# Patient Record
Sex: FEMALE | Race: WHITE | NOT HISPANIC OR LATINO | URBAN - METROPOLITAN AREA
[De-identification: names, ages, dates, MRNs, and addresses within clinical notes are randomized per-mention and may not be internally consistent; named-entity substitution may affect disease eponyms.]

---

## 2019-07-29 PROBLEM — Z00.00 ENCOUNTER FOR PREVENTIVE HEALTH EXAMINATION: Status: ACTIVE | Noted: 2019-07-29

## 2019-08-01 ENCOUNTER — OUTPATIENT (OUTPATIENT)
Dept: OUTPATIENT SERVICES | Facility: HOSPITAL | Age: 84
LOS: 1 days | End: 2019-08-01
Payer: MEDICARE

## 2019-08-01 DIAGNOSIS — R00.2 PALPITATIONS: ICD-10-CM

## 2019-08-01 PROCEDURE — 93227 XTRNL ECG REC<48 HR R&I: CPT

## 2019-08-02 ENCOUNTER — OUTPATIENT (OUTPATIENT)
Dept: OUTPATIENT SERVICES | Facility: HOSPITAL | Age: 84
LOS: 1 days | End: 2019-08-02
Payer: SELF-PAY

## 2019-08-02 ENCOUNTER — APPOINTMENT (OUTPATIENT)
Dept: CT IMAGING | Facility: HOSPITAL | Age: 84
End: 2019-08-02

## 2019-08-02 PROCEDURE — 75571 CT HRT W/O DYE W/CA TEST: CPT | Mod: 26

## 2019-08-03 ENCOUNTER — INPATIENT (INPATIENT)
Facility: HOSPITAL | Age: 84
LOS: 0 days | Discharge: HOME | DRG: 292 | End: 2019-08-04
Attending: INTERNAL MEDICINE | Admitting: INTERNAL MEDICINE
Payer: MEDICARE

## 2019-08-03 VITALS
OXYGEN SATURATION: 99 % | SYSTOLIC BLOOD PRESSURE: 218 MMHG | DIASTOLIC BLOOD PRESSURE: 110 MMHG | TEMPERATURE: 98 F | RESPIRATION RATE: 18 BRPM | WEIGHT: 149.91 LBS | HEART RATE: 99 BPM | HEIGHT: 66 IN

## 2019-08-03 DIAGNOSIS — I10 ESSENTIAL (PRIMARY) HYPERTENSION: ICD-10-CM

## 2019-08-03 DIAGNOSIS — R09.89 OTHER SPECIFIED SYMPTOMS AND SIGNS INVOLVING THE CIRCULATORY AND RESPIRATORY SYSTEMS: ICD-10-CM

## 2019-08-03 LAB
ALBUMIN SERPL ELPH-MCNC: 3.8 G/DL — SIGNIFICANT CHANGE UP (ref 3.3–5)
ALP SERPL-CCNC: 81 U/L — SIGNIFICANT CHANGE UP (ref 40–120)
ALT FLD-CCNC: 100 U/L — HIGH (ref 10–45)
ANION GAP SERPL CALC-SCNC: 11 MMOL/L — SIGNIFICANT CHANGE UP (ref 5–17)
ANION GAP SERPL CALC-SCNC: 12 MMOL/L — SIGNIFICANT CHANGE UP (ref 5–17)
APPEARANCE UR: CLEAR — SIGNIFICANT CHANGE UP
APTT BLD: 26.9 SEC — LOW (ref 27.5–36.3)
AST SERPL-CCNC: 61 U/L — HIGH (ref 10–40)
BASOPHILS # BLD AUTO: 0.03 K/UL — SIGNIFICANT CHANGE UP (ref 0–0.2)
BASOPHILS NFR BLD AUTO: 0.5 % — SIGNIFICANT CHANGE UP (ref 0–2)
BILIRUB SERPL-MCNC: 0.4 MG/DL — SIGNIFICANT CHANGE UP (ref 0.2–1.2)
BILIRUB UR-MCNC: NEGATIVE — SIGNIFICANT CHANGE UP
BUN SERPL-MCNC: 13 MG/DL — SIGNIFICANT CHANGE UP (ref 7–23)
BUN SERPL-MCNC: 13 MG/DL — SIGNIFICANT CHANGE UP (ref 7–23)
CALCIUM SERPL-MCNC: 9.3 MG/DL — SIGNIFICANT CHANGE UP (ref 8.4–10.5)
CALCIUM SERPL-MCNC: 9.6 MG/DL — SIGNIFICANT CHANGE UP (ref 8.4–10.5)
CHLORIDE SERPL-SCNC: 107 MMOL/L — SIGNIFICANT CHANGE UP (ref 96–108)
CHLORIDE SERPL-SCNC: 107 MMOL/L — SIGNIFICANT CHANGE UP (ref 96–108)
CK MB CFR SERPL CALC: 2.7 NG/ML — SIGNIFICANT CHANGE UP (ref 0–6.7)
CK SERPL-CCNC: 77 U/L — SIGNIFICANT CHANGE UP (ref 25–170)
CO2 SERPL-SCNC: 25 MMOL/L — SIGNIFICANT CHANGE UP (ref 22–31)
CO2 SERPL-SCNC: 27 MMOL/L — SIGNIFICANT CHANGE UP (ref 22–31)
COLOR SPEC: YELLOW — SIGNIFICANT CHANGE UP
CREAT SERPL-MCNC: 0.82 MG/DL — SIGNIFICANT CHANGE UP (ref 0.5–1.3)
CREAT SERPL-MCNC: 0.93 MG/DL — SIGNIFICANT CHANGE UP (ref 0.5–1.3)
DIFF PNL FLD: NEGATIVE — SIGNIFICANT CHANGE UP
EOSINOPHIL # BLD AUTO: 0.18 K/UL — SIGNIFICANT CHANGE UP (ref 0–0.5)
EOSINOPHIL NFR BLD AUTO: 2.8 % — SIGNIFICANT CHANGE UP (ref 0–6)
GLUCOSE SERPL-MCNC: 102 MG/DL — HIGH (ref 70–99)
GLUCOSE SERPL-MCNC: 124 MG/DL — HIGH (ref 70–99)
GLUCOSE UR QL: NEGATIVE — SIGNIFICANT CHANGE UP
HCT VFR BLD CALC: 42.4 % — SIGNIFICANT CHANGE UP (ref 34.5–45)
HCT VFR BLD CALC: 43.1 % — SIGNIFICANT CHANGE UP (ref 34.5–45)
HGB BLD-MCNC: 12.8 G/DL — SIGNIFICANT CHANGE UP (ref 11.5–15.5)
HGB BLD-MCNC: 13.1 G/DL — SIGNIFICANT CHANGE UP (ref 11.5–15.5)
IMM GRANULOCYTES NFR BLD AUTO: 0.2 % — SIGNIFICANT CHANGE UP (ref 0–1.5)
INR BLD: 1.11 — SIGNIFICANT CHANGE UP (ref 0.88–1.16)
KETONES UR-MCNC: NEGATIVE — SIGNIFICANT CHANGE UP
LEUKOCYTE ESTERASE UR-ACNC: NEGATIVE — SIGNIFICANT CHANGE UP
LYMPHOCYTES # BLD AUTO: 1.62 K/UL — SIGNIFICANT CHANGE UP (ref 1–3.3)
LYMPHOCYTES # BLD AUTO: 25.5 % — SIGNIFICANT CHANGE UP (ref 13–44)
MAGNESIUM SERPL-MCNC: 2.1 MG/DL — SIGNIFICANT CHANGE UP (ref 1.6–2.6)
MAGNESIUM SERPL-MCNC: 2.2 MG/DL — SIGNIFICANT CHANGE UP (ref 1.6–2.6)
MCHC RBC-ENTMCNC: 27.8 PG — SIGNIFICANT CHANGE UP (ref 27–34)
MCHC RBC-ENTMCNC: 27.9 PG — SIGNIFICANT CHANGE UP (ref 27–34)
MCHC RBC-ENTMCNC: 30.2 GM/DL — LOW (ref 32–36)
MCHC RBC-ENTMCNC: 30.4 GM/DL — LOW (ref 32–36)
MCV RBC AUTO: 91.9 FL — SIGNIFICANT CHANGE UP (ref 80–100)
MCV RBC AUTO: 92 FL — SIGNIFICANT CHANGE UP (ref 80–100)
MONOCYTES # BLD AUTO: 0.46 K/UL — SIGNIFICANT CHANGE UP (ref 0–0.9)
MONOCYTES NFR BLD AUTO: 7.2 % — SIGNIFICANT CHANGE UP (ref 2–14)
NEUTROPHILS # BLD AUTO: 4.05 K/UL — SIGNIFICANT CHANGE UP (ref 1.8–7.4)
NEUTROPHILS NFR BLD AUTO: 63.8 % — SIGNIFICANT CHANGE UP (ref 43–77)
NITRITE UR-MCNC: NEGATIVE — SIGNIFICANT CHANGE UP
NRBC # BLD: 0 /100 WBCS — SIGNIFICANT CHANGE UP (ref 0–0)
NRBC # BLD: 0 /100 WBCS — SIGNIFICANT CHANGE UP (ref 0–0)
NT-PROBNP SERPL-SCNC: HIGH PG/ML (ref 0–300)
NT-PROBNP SERPL-SCNC: HIGH PG/ML (ref 0–300)
PH UR: 7 — SIGNIFICANT CHANGE UP (ref 5–8)
PLATELET # BLD AUTO: 191 K/UL — SIGNIFICANT CHANGE UP (ref 150–400)
PLATELET # BLD AUTO: 238 K/UL — SIGNIFICANT CHANGE UP (ref 150–400)
POTASSIUM SERPL-MCNC: 3.9 MMOL/L — SIGNIFICANT CHANGE UP (ref 3.5–5.3)
POTASSIUM SERPL-MCNC: 4.1 MMOL/L — SIGNIFICANT CHANGE UP (ref 3.5–5.3)
POTASSIUM SERPL-SCNC: 3.9 MMOL/L — SIGNIFICANT CHANGE UP (ref 3.5–5.3)
POTASSIUM SERPL-SCNC: 4.1 MMOL/L — SIGNIFICANT CHANGE UP (ref 3.5–5.3)
PROT SERPL-MCNC: 6.5 G/DL — SIGNIFICANT CHANGE UP (ref 6–8.3)
PROT UR-MCNC: NEGATIVE MG/DL — SIGNIFICANT CHANGE UP
PROTHROM AB SERPL-ACNC: 12.6 SEC — SIGNIFICANT CHANGE UP (ref 10–12.9)
RBC # BLD: 4.61 M/UL — SIGNIFICANT CHANGE UP (ref 3.8–5.2)
RBC # BLD: 4.69 M/UL — SIGNIFICANT CHANGE UP (ref 3.8–5.2)
RBC # FLD: 14.3 % — SIGNIFICANT CHANGE UP (ref 10.3–14.5)
RBC # FLD: 14.3 % — SIGNIFICANT CHANGE UP (ref 10.3–14.5)
SODIUM SERPL-SCNC: 144 MMOL/L — SIGNIFICANT CHANGE UP (ref 135–145)
SODIUM SERPL-SCNC: 145 MMOL/L — SIGNIFICANT CHANGE UP (ref 135–145)
SP GR SPEC: 1.01 — SIGNIFICANT CHANGE UP (ref 1–1.03)
TROPONIN T SERPL-MCNC: <0.01 NG/ML — SIGNIFICANT CHANGE UP (ref 0–0.01)
TROPONIN T SERPL-MCNC: <0.01 NG/ML — SIGNIFICANT CHANGE UP (ref 0–0.01)
TSH SERPL-MCNC: 3.7 UIU/ML — SIGNIFICANT CHANGE UP (ref 0.35–4.94)
UROBILINOGEN FLD QL: 0.2 E.U./DL — SIGNIFICANT CHANGE UP
WBC # BLD: 6.35 K/UL — SIGNIFICANT CHANGE UP (ref 3.8–10.5)
WBC # BLD: 7.87 K/UL — SIGNIFICANT CHANGE UP (ref 3.8–10.5)
WBC # FLD AUTO: 6.35 K/UL — SIGNIFICANT CHANGE UP (ref 3.8–10.5)
WBC # FLD AUTO: 7.87 K/UL — SIGNIFICANT CHANGE UP (ref 3.8–10.5)

## 2019-08-03 PROCEDURE — 71046 X-RAY EXAM CHEST 2 VIEWS: CPT | Mod: 26

## 2019-08-03 PROCEDURE — 93306 TTE W/DOPPLER COMPLETE: CPT | Mod: 26

## 2019-08-03 PROCEDURE — 93010 ELECTROCARDIOGRAM REPORT: CPT

## 2019-08-03 PROCEDURE — 99285 EMERGENCY DEPT VISIT HI MDM: CPT | Mod: 25

## 2019-08-03 PROCEDURE — 99223 1ST HOSP IP/OBS HIGH 75: CPT

## 2019-08-03 RX ORDER — LISINOPRIL 2.5 MG/1
10 TABLET ORAL DAILY
Refills: 0 | Status: DISCONTINUED | OUTPATIENT
Start: 2019-08-03 | End: 2019-08-03

## 2019-08-03 RX ORDER — FUROSEMIDE 40 MG
20 TABLET ORAL ONCE
Refills: 0 | Status: COMPLETED | OUTPATIENT
Start: 2019-08-03 | End: 2019-08-03

## 2019-08-03 RX ORDER — HYDRALAZINE HCL 50 MG
10 TABLET ORAL ONCE
Refills: 0 | Status: COMPLETED | OUTPATIENT
Start: 2019-08-03 | End: 2019-08-03

## 2019-08-03 RX ORDER — LISINOPRIL 2.5 MG/1
10 TABLET ORAL ONCE
Refills: 0 | Status: COMPLETED | OUTPATIENT
Start: 2019-08-03 | End: 2019-08-03

## 2019-08-03 RX ORDER — AMLODIPINE BESYLATE 2.5 MG/1
5 TABLET ORAL DAILY
Refills: 0 | Status: DISCONTINUED | OUTPATIENT
Start: 2019-08-03 | End: 2019-08-03

## 2019-08-03 RX ORDER — FUROSEMIDE 40 MG
20 TABLET ORAL DAILY
Refills: 0 | Status: DISCONTINUED | OUTPATIENT
Start: 2019-08-03 | End: 2019-08-04

## 2019-08-03 RX ORDER — AMLODIPINE BESYLATE 2.5 MG/1
5 TABLET ORAL ONCE
Refills: 0 | Status: COMPLETED | OUTPATIENT
Start: 2019-08-03 | End: 2019-08-03

## 2019-08-03 RX ORDER — AMLODIPINE BESYLATE 2.5 MG/1
10 TABLET ORAL DAILY
Refills: 0 | Status: DISCONTINUED | OUTPATIENT
Start: 2019-08-04 | End: 2019-08-04

## 2019-08-03 RX ORDER — LISINOPRIL 2.5 MG/1
20 TABLET ORAL DAILY
Refills: 0 | Status: DISCONTINUED | OUTPATIENT
Start: 2019-08-04 | End: 2019-08-04

## 2019-08-03 RX ADMIN — Medication 20 MILLIGRAM(S): at 10:23

## 2019-08-03 RX ADMIN — Medication 10 MILLIGRAM(S): at 15:40

## 2019-08-03 RX ADMIN — Medication 20 MILLIGRAM(S): at 02:41

## 2019-08-03 RX ADMIN — AMLODIPINE BESYLATE 5 MILLIGRAM(S): 2.5 TABLET ORAL at 02:41

## 2019-08-03 RX ADMIN — Medication 10 MILLIGRAM(S): at 04:23

## 2019-08-03 RX ADMIN — AMLODIPINE BESYLATE 5 MILLIGRAM(S): 2.5 TABLET ORAL at 10:09

## 2019-08-03 RX ADMIN — LISINOPRIL 10 MILLIGRAM(S): 2.5 TABLET ORAL at 10:09

## 2019-08-03 RX ADMIN — LISINOPRIL 10 MILLIGRAM(S): 2.5 TABLET ORAL at 02:41

## 2019-08-03 NOTE — ED PROVIDER NOTE - CARE PLAN
Principal Discharge DX:	Hypertension, unspecified type  Secondary Diagnosis:	Pulmonary vascular congestion  Secondary Diagnosis:	Pleural effusion

## 2019-08-03 NOTE — ED PROVIDER NOTE - PROGRESS NOTE DETAILS
elevated htn, pulm vasc congestion on xray. will given lasix and norvasc.  spoke with cards fellow - will admit to tele for medical mgmt

## 2019-08-03 NOTE — ED PROVIDER NOTE - OBJECTIVE STATEMENT
84F hx htn, advised to come to ED after results of CT coronary.  pt states was at her doctors office and told she was having fast heart beat and was sent for CT.  CT shows moderate to severe CHF, with b/l pleural effusions.  pt admits to some SOB with exertion, +LE swelling.  states at one point was on norvasc for htn, however discontinued it herself and she felt she could take vitamins and herbs instead. no chest pain. no n/v. no fevers. no HA.

## 2019-08-03 NOTE — PROGRESS NOTE ADULT - PROBLEM SELECTOR PLAN 1
- BNP 10,192, repeat 11,022  - cxr: Cardiomegaly. Pulmonary congestion. Small pleural effusions  - being diuresed with Lasix 20 mg IV daily. Today is IV last dose  - daily weights  - echo today  - DVT prophylaxis  - I&Os

## 2019-08-03 NOTE — H&P ADULT - NSHPREVIEWOFSYSTEMS_GEN_ALL_CORE
REVIEW OF SYSTEMS      General:	feeling well    Respiratory and Thorax: mild SOB with exertion  	  Cardiovascular: + edema, denies CP/palpitations	    Gastrointestinal:	denies abd pain, constipation, diarrhea    Genitourinary:	denies urinary frequency    Musculoskeletal:	denies weakness    Neurological:	denies dizziness/syncope/lightheadedness    Psychiatric:	no change in mental status

## 2019-08-03 NOTE — H&P ADULT - ATTENDING COMMENTS
See PA note written above, for details. I reviewed the PA documentation.  I reviewed vitals, labs, medications, cardiac studies and additional imaging.  I agree with the PA's findings and plans as written above with the following additions/amendments:  Hypertensive Urgency  BP control with Lisinopril and Amlodipine   -->augment to 20 and 10 respectively if SBP remains >160  Lasix 20IV x1 to alleviate mild hypervolemia   Anticipate discharge within 24hr pending continued improvement in SBP  NB: patient prefers holistic remedies and is not adherent to traditional western medicine rx  GIOVANNY Vasquez.  Cardiology Attending

## 2019-08-03 NOTE — ED ADULT TRIAGE NOTE - ARRIVAL INFO ADDITIONAL COMMENTS
pt has had cardiac work up over the last few days and was called by a radiologist tonight and told to come to ER because she has water on her lungs.  pt denies sob or chest pain.

## 2019-08-03 NOTE — H&P ADULT - PROBLEM SELECTOR PLAN 1
-CTA coronaries shows bilateral pulmonary effusions, s/p lasix 20mg IV x 1 dose in ED  -pt currently satting >95% on RA, cont to monitor O2 sats  -monitor I&Os, daily weights  -echo in am -CTA coronaries shows Moderate to severe CHF, Moderate right and small left pleural effusions, Trace pericardial effusion, Pulmonary arterial hypertension, s/p lasix 20mg IV x 1 dose in ED  -pt currently satting >95% on RA, cont to monitor O2 sats  -monitor I&Os, daily weights  -echo in am  -cont ACE-I/CCB  -lasix 20mg IV daily

## 2019-08-03 NOTE — H&P ADULT - NSHPLABSRESULTS_GEN_ALL_CORE
CARDIAC MARKERS ( 03 Aug 2019 01:00 )  x     / <0.01 ng/mL / 77 U/L / x     / 2.7 ng/mL    08-03    145  |  107  |  13  ----------------------------<  124<H>  3.9   |  27  |  0.93    Ca    9.3      03 Aug 2019 01:00  Mg     2.2     08-03    TPro  6.5  /  Alb  3.8  /  TBili  0.4  /  DBili  x   /  AST  61<H>  /  ALT  100<H>  /  AlkPhos  81  08-03                          12.8   6.35  )-----------( 238      ( 03 Aug 2019 01:00 )             42.4

## 2019-08-03 NOTE — H&P ADULT - HISTORY OF PRESENT ILLNESS
83y/o female with PMHx HTN (not on prescription meds, only takes herbal supplements) who presented to the ED @ [facility] after being notified this evening of new findings of CHF, pulmonary edema on CTA coronaries performed earlier on 8/2. She is currently asymptomatic and only admits to some regional exertional SOB. Upon arrival to ED patient was found to be hypertensive with /100. She was given 5mg norvasc PO x 1, 10mg lisinopril PO x 1, and 20mg lasix IV x 1. Her O2 sats were normal and EKG shows . She is now admitted to [facility] for further management of newly diagnosed CHF and hypertensive urgency. 83y/o female (poor historian) with PMHx HTN (not on prescription meds, only takes herbal supplements), family hx MI (father, 60s) who presented to the ED @ [facility] after being notified this evening of new findings of CHF, pulmonary edema on CTA coronaries performed earlier on 8/2. She is currently asymptomatic and only admits to some recent exertional SOB over the past two days (she works out on a bike 2h/day and has not noticed decrease in exercise tolerance). She states that she saw her primary doctor who performed an EKG earlier this week which showed PVCs. She was concerned so she had him send her for CTA coronaries. Upon arrival to ED patient was found to be hypertensive with /100. She was given 5mg norvasc PO x 1, 10mg lisinopril PO x 1, and 20mg lasix IV x 1. Her O2 sats were normal and EKG shows NSR with PVCs. She is now admitted to [facility] for further management of newly diagnosed CHF and hypertensive urgency.

## 2019-08-03 NOTE — PROGRESS NOTE ADULT - ASSESSMENT
85y/o female (poor historian) with PMHx HTN (not on prescription meds, only takes herbal supplements), family hx MI (father, 60s) who presented to the ED @ [facility] after being notified this evening of new findings of CHF, pulmonary edema on CTA coronaries performed earlier on 8/2. She is currently asymptomatic and only admits to some recent exertional SOB over the past two days (she works out on a bike 2h/day and has not noticed decrease in exercise tolerance). She states that she saw her primary doctor who performed an EKG earlier this week which showed PVCs. She was concerned so she had him send her for CTA coronaries. Upon arrival to ED patient was found to be hypertensive with /100. BNP on admit 10,192  Admitted with presumed chf exacerbation for management and evaluation underlying causes

## 2019-08-03 NOTE — ED ADULT NURSE NOTE - CHPI ED NUR SYMPTOMS NEG
no chest pain/no chills/no syncope/no vomiting/no nausea/no congestion/no diaphoresis/no fever/no dizziness/no back pain

## 2019-08-03 NOTE — ED ADULT NURSE NOTE - OBJECTIVE STATEMENT
pt has had cardiac work up over the last few days and was called by a radiologist tonight and told to come to ER because she has water on her lungs.  pt denies sob or chest pain.  Calcium Score CT from 8/2/19 AM shows CHF, pt. denies knowledge

## 2019-08-03 NOTE — H&P ADULT - NSHPPHYSICALEXAM_GEN_ALL_CORE
PHYSICAL EXAM:      Constitutional: NAD, breathing comfortable on RA    Eyes: PEERLA    Neck: no carotid bruit    Respiratory: CTA B/L    Cardiovascular: RRR, +S1/S2    Gastrointestinal: abd NT/ND    Extremities: +2 pitting edema B/L    Vascular: extremities warm, +pedal pulses    Neurological: A+O x 3    Skin: in tact    Musculoskeletal: normal gait

## 2019-08-03 NOTE — PROGRESS NOTE ADULT - SUBJECTIVE AND OBJECTIVE BOX
Interventional Cardiology PA Adult Progress Note    cc: leg edema, suspected CHF exacerbation    Subjective Assessment: Pt seen and examined at bed side. Currently denies CP, SOB, dizziness, palpitations, PND  	     ROS negative except per subjective and HPI    MEDICATIONS:  furosemide   Injectable 20 milliGRAM(s) IV Push daily    [PHYSICAL EXAM:  TELEMETRY:  T(C): 36.3 (08-03-19 @ 09:51), Max: 36.7 (08-03-19 @ 02:00)  HR: 90 (08-03-19 @ 09:55) (75 - 99)  BP: 165/75 (08-03-19 @ 09:55) (165/75 - 218/110)  RR: 16 (08-03-19 @ 09:55) (16 - 20)  SpO2: 98% (08-03-19 @ 09:55) (97% - 100%)  Wt(kg): --  I&O's Summary    02 Aug 2019 07:01  -  03 Aug 2019 07:00  --------------------------------------------------------  IN: 0 mL / OUT: 1500 mL / NET: -1500 mL    03 Aug 2019 07:01  -  03 Aug 2019 13:01  --------------------------------------------------------  IN: 300 mL / OUT: 0 mL / NET: 300 mL      Height (cm): 167.64 (08-03 @ 00:08)  Weight (kg): 69.7 (08-03 @ 03:54)  BMI (kg/m2): 24.8 (08-03 @ 03:54)  BSA (m2): 1.79 (08-03 @ 03:54)  Ring:  Central/PICC/Mid Line:                                         Appearance: Normal	  HEENT:   Normal oral mucosa, PERRL, EOMI	  Neck: Supple, - JVD;   Cardiovascular: Normal S1 S2, No JVD, No murmurs,   Respiratory: Lungs clear to auscultation, No Rales, Rhonchi, Wheezing	  Gastrointestinal:  Soft, Non-tender, bowel sounds present	  Skin: No rashes, No ecchymoses, No cyanosis  Extremities: Normal range of motion, No clubbing, cyanosis. + bilateral mild edema  Neurologic: Non-focal  Psychiatry: A & O x 3, Mood & affect appropriate      	    	    LABS:	 	                          13.1   7.87  )-----------( 191      ( 03 Aug 2019 06:56 )             43.1     08-03    144  |  107  |  13  ----------------------------<  102<H>  4.1   |  25  |  0.82    Ca    9.6      03 Aug 2019 06:56  Mg     2.1     08-03    TPro  6.5  /  Alb  3.8  /  TBili  0.4  /  DBili  x   /  AST  61<H>  /  ALT  100<H>  /  AlkPhos  81  08-03    proBNP: Serum Pro-Brain Natriuretic Peptide: 18322 pg/mL (08-03 @ 06:56)  Serum Pro-Brain Natriuretic Peptide: 29947 pg/mL (08-03 @ 01:00)    Lipid Profile:   HgA1c:   TSH: Thyroid Stimulating Hormone, Serum: 3.702 uIU/mL (08-03 @ 01:00)    PT/INR - ( 03 Aug 2019 01:00 )   PT: 12.6 sec;   INR: 1.11          PTT - ( 03 Aug 2019 01:00 )  PTT:26.9 sec    < from: Xray Chest 2 Views PA/Lat (08.03.19 @ 02:00) >  IMPRESSION: Cardiomegaly. Pulmonary congestion.Small pleural effusions.

## 2019-08-03 NOTE — H&P ADULT - ASSESSMENT
85y/o female (poor historian) with PMHx HTN (not on prescription meds, only takes herbal supplements), family hx MI (father, 60s) who presented to the ED @ [facility] after being notified this evening of new findings of CHF, pulmonary edema on CTA coronaries performed earlier on 8/2, now admitted to [facility] for further management of newly diagnosed CHF and hypertensive urgency.

## 2019-08-03 NOTE — H&P ADULT - PROBLEM SELECTOR PLAN 2
-s/p norvasc 5mg PO x 1, lisinopril 10mg PO x 1 in ED with no improvement, given 10mg hydralazine IV x 1 with improvement of BP to 160s/70s  -cont norvasc, lisinopril daily  -pt currently asymptomatic

## 2019-08-03 NOTE — PROGRESS NOTE ADULT - PROBLEM SELECTOR PLAN 2
- pt was not on any regiment at home  - Increase Amlodipine from 5 mg ot 10 mg po daily, increase Lisinopril from 10 mg to 20 mg po daily

## 2019-08-04 ENCOUNTER — TRANSCRIPTION ENCOUNTER (OUTPATIENT)
Age: 84
End: 2019-08-04

## 2019-08-04 VITALS — WEIGHT: 149.91 LBS

## 2019-08-04 PROCEDURE — 93010 ELECTROCARDIOGRAM REPORT: CPT

## 2019-08-04 PROCEDURE — 99239 HOSP IP/OBS DSCHRG MGMT >30: CPT

## 2019-08-04 RX ORDER — FUROSEMIDE 40 MG
1 TABLET ORAL
Qty: 30 | Refills: 0
Start: 2019-08-04 | End: 2019-09-02

## 2019-08-04 RX ORDER — AMLODIPINE BESYLATE 2.5 MG/1
1 TABLET ORAL
Qty: 30 | Refills: 0
Start: 2019-08-04 | End: 2019-09-02

## 2019-08-04 RX ORDER — HYDRALAZINE HCL 50 MG
10 TABLET ORAL ONCE
Refills: 0 | Status: COMPLETED | OUTPATIENT
Start: 2019-08-04 | End: 2019-08-04

## 2019-08-04 RX ORDER — LISINOPRIL 2.5 MG/1
1 TABLET ORAL
Qty: 30 | Refills: 0
Start: 2019-08-04 | End: 2019-09-02

## 2019-08-04 RX ORDER — FUROSEMIDE 40 MG
40 TABLET ORAL DAILY
Refills: 0 | Status: DISCONTINUED | OUTPATIENT
Start: 2019-08-04 | End: 2019-08-04

## 2019-08-04 RX ORDER — METOPROLOL TARTRATE 50 MG
25 TABLET ORAL DAILY
Refills: 0 | Status: DISCONTINUED | OUTPATIENT
Start: 2019-08-04 | End: 2019-08-04

## 2019-08-04 RX ORDER — METOPROLOL TARTRATE 50 MG
1 TABLET ORAL
Qty: 30 | Refills: 0
Start: 2019-08-04 | End: 2019-09-02

## 2019-08-04 RX ORDER — NITROGLYCERIN 6.5 MG
0.4 CAPSULE, EXTENDED RELEASE ORAL
Refills: 0 | Status: DISCONTINUED | OUTPATIENT
Start: 2019-08-04 | End: 2019-08-04

## 2019-08-04 RX ORDER — ACETAMINOPHEN 500 MG
650 TABLET ORAL ONCE
Refills: 0 | Status: COMPLETED | OUTPATIENT
Start: 2019-08-04 | End: 2019-08-04

## 2019-08-04 RX ADMIN — AMLODIPINE BESYLATE 10 MILLIGRAM(S): 2.5 TABLET ORAL at 05:48

## 2019-08-04 RX ADMIN — Medication 30 MILLILITER(S): at 01:20

## 2019-08-04 RX ADMIN — LISINOPRIL 20 MILLIGRAM(S): 2.5 TABLET ORAL at 05:48

## 2019-08-04 RX ADMIN — Medication 10 MILLIGRAM(S): at 00:15

## 2019-08-04 RX ADMIN — Medication 25 MILLIGRAM(S): at 11:26

## 2019-08-04 RX ADMIN — Medication 20 MILLIGRAM(S): at 05:48

## 2019-08-04 NOTE — DIETITIAN INITIAL EVALUATION ADULT. - ENERGY NEEDS
Height: 5'6" Weight: 69.7 kg, 68.9 kg (8/4), IBW 59 kg+/-10%, %%, BMI 24.8,  ABW used to calculate EER as per pt's current body weight is within % IBW   Estimated nutrient needs based on [facility] SOC for maintenance in older adults

## 2019-08-04 NOTE — DISCHARGE NOTE NURSING/CASE MANAGEMENT/SOCIAL WORK - NSDCDPATPORTLINK_GEN_ALL_CORE
You can access the [facility] Patient Portal, offered by [facility], by registering with the following website: [email or web address]

## 2019-08-04 NOTE — DISCHARGE NOTE PROVIDER - HOSPITAL COURSE
85y/o female (poor historian) with PMHx HTN (not on prescription meds, only takes herbal supplements), family hx MI (father, 60s) who presented to the ED @ [facility] after being notified this evening of new findings of CHF, pulmonary edema on CTA coronaries performed earlier on 8/2. She is currently asymptomatic and only admits to some recent exertional SOB over the past two days (she works out on a bike 2h/day and has not noticed decrease in exercise tolerance). She states that she saw her primary doctor who performed an EKG earlier this week which showed PVCs. She was concerned so she had him send her for CTA coronaries. Upon arrival to ED patient was found to be hypertensive with /100. She was given 5mg norvasc PO x 1, 10mg lisinopril PO x 1, and 20mg lasix IV x 1. Her O2 sats were normal and EKG shows NSR with PVCs. She is now admitted to [facility] for further management of newly diagnosed CHF and hypertensive urgency. 85y/o female (poor historian) with PMHx HTN (not on prescription meds, only takes herbal supplements), family hx MI (father, 60s) who presented to the ED @ [facility] after being notified this evening of new findings of CHF, pulmonary edema on CTA coronaries performed earlier on 8/2. She is currently asymptomatic and only admits to some recent exertional SOB over the past two days (she works out on a bike 2h/day and has not noticed decrease in exercise tolerance). She states that she saw her primary doctor who performed an EKG earlier this week which showed PVCs. She was concerned so she had him send her for CTA coronaries. Upon arrival to ED patient was found to be hypertensive with /100. She was given 5mg norvasc PO x 1, 10mg lisinopril PO x 1, and 20mg lasix IV x 1. Her O2 sats were normal and EKG shows NSR with PVCs. She is now admitted to [facility] for further management of newly diagnosed CHF and hypertensive urgency.     Pt 85y/o female (poor historian) with PMHx HTN (not on prescription meds, only takes herbal supplements), family hx MI (father, 60s) who presented to the ED @ [facility] after being notified this evening of new findings of CHF, pulmonary edema on CTA coronaries performed earlier on 8/2. She is currently asymptomatic and only admits to some recent exertional SOB over the past two days (she works out on a bike 2h/day and has not noticed decrease in exercise tolerance). She states that she saw her primary doctor who performed an EKG earlier this week which showed PVCs. She was concerned so she had him send her for CTA coronaries. Upon arrival to ED patient was found to be hypertensive with /100. She was given 5mg norvasc PO x 1, 10mg lisinopril PO x 1, and 20mg lasix IV x 1. Her O2 sats were normal and EKG shows NSR with PVCs. She is now admitted to [facility] for further management of newly diagnosed CHF and hypertensive urgency.     Pt had an Echo revealing mild symmetric LVH, mod segmental LV systolic dysfunction with akinesis of basal inferoseptum, entire inferior wall and entire inferolateral wall, EF 30-35%. She was started on Lisinopril 20mg, Toprol XL 25mg, Amlodipine 10mg and diuresed with Lasix IV 20mg. 85y/o female (poor historian) with PMHx HTN (not on prescription meds, only takes herbal supplements), family hx MI (father, 60s) who presented to the ED @ [facility] after being notified this evening of new findings of CHF, pulmonary edema on CT Calcium score imaging test performed earlier on 8/2. She is currently asymptomatic and only admits to some recent exertional SOB over the past two days (she works out on a bike 2h/day and has not noticed decrease in exercise tolerance). She states that she saw her primary doctor who performed an EKG earlier this week which showed PVCs. She was concerned so she had him send her for Calcium scoring. Upon arrival to ED patient was found to be hypertensive with /100. She was given 5mg norvasc PO x 1, 10mg lisinopril PO x 1, and 20mg lasix IV x 1. Her O2 sats were normal and EKG shows NSR with PVCs. She is now admitted to [facility] for further management of newly diagnosed CHF and hypertensive urgency.     Pt had an Echo revealing mild symmetric LVH, mod segmental LV systolic dysfunction with akinesis of basal inferoseptum, entire inferior wall and entire inferolateral wall, EF 30-35%. Multiple extensive conversations held with the patient and her son Emre.  The patient does not hold to Western medicine practices and takes anywhere from 40-60 herbal remedies and homeopathic supplements.  The patient is not amenable to ischemic evaluation, declines cardiac angiography and initially declined traditional medial therapy.  After discussion with son Emre Montejo and patient, and after providing reading material on recommended cardiac medications, the patient is amenable to taking the following regimen: Lisinopril 20mg, Toprol XL 25mg, Amlodipine 10mg and Lasix 40mg po daily. The patient agrees to follow up with her Cardiologist Dr Frost within 1 week of discharge and affirms that she will take the medications as prescribed. Of note, patient states she will continue to take her multiple herbal remedies, notably garlic, parsley, karen, and dandelion. 85y/o female (poor historian) with PMHx HTN (not on prescription meds, only takes herbal supplements), family hx MI (father, 60s) who presented to the ED @ [facility] after being notified this evening of new findings of CHF, pulmonary edema on CT Calcium score imaging test performed earlier on 8/2. She is currently asymptomatic and only admits to some recent exertional SOB over the past two days (she works out on a bike 2h/day and has not noticed decrease in exercise tolerance). She states that she saw her primary doctor who performed an EKG earlier this week which showed PVCs. She was concerned so she had him send her for Calcium scoring. Upon arrival to ED patient was found to be hypertensive with /100. She was given 5mg norvasc PO x 1, 10mg lisinopril PO x 1, and 20mg lasix IV x 1. Her O2 sats were normal and EKG shows NSR with PVCs. [facility] is [facility] for further management of newly diagnosed CHF and hypertensive urgency.     Pt had an Echo revealing mild symmetric LVH, mod segmental LV systolic dysfunction with akinesis of basal inferoseptum, entire inferior wall and entire inferolateral wall, EF 30-35%. Multiple extensive conversations held with the patient and her son Emre.  The patient does not hold to Western medicine practices and takes anywhere from 40-60 herbal remedies and homeopathic supplements.  The patient is not amenable to ischemic evaluation, declines cardiac angiography and initially declined traditional medial therapy.  After discussion with son Emre Montejo and patient, and after providing reading material on recommended cardiac medications, the patient is amenable to taking the following regimen: Lisinopril 20mg, Toprol XL 25mg, Amlodipine 10mg and Lasix 40mg po daily. The patient agrees to follow up with her Cardiologist Dr Frost within 1 week of discharge and affirms that she will take the medications as prescribed. Of note, patient states she will continue to take her multiple herbal remedies, notably garlic, parsley, karen, and dandelion.     Pt seen and examined at bedside this AM without any complaints or events overnight. VSS and telemetry reviewed and pt stable for discharge as discussed with Dr. Beasley. Pt has been given discharge instructions, including medication regimen and follow up.

## 2019-08-04 NOTE — CHART NOTE - NSCHARTNOTEFT_GEN_A_CORE
pt refused blood draw twice this AM, stephani Beasley made aware. pt refused blood draw twice this AM, Dr. Beasley made aware.

## 2019-08-04 NOTE — DIETITIAN INITIAL EVALUATION ADULT. - PROBLEM SELECTOR PLAN 1
-CTA coronaries shows Moderate to severe CHF, Moderate right and small left pleural effusions, Trace pericardial effusion, Pulmonary arterial hypertension, s/p lasix 20mg IV x 1 dose in ED  -pt currently satting >95% on RA, cont to monitor O2 sats  -monitor I&Os, daily weights  -echo in am  -cont ACE-I/CCB  -lasix 20mg IV daily

## 2019-08-04 NOTE — DISCHARGE NOTE PROVIDER - NSDCCPCAREPLAN_GEN_ALL_CORE_FT
PRINCIPAL DISCHARGE DIAGNOSIS  Diagnosis: Congestive heart failure (CHF)  Assessment and Plan of Treatment: You have a weak heart or also known as heart failure. Continue medications exactly as listed. Avoid drinking more than 1.5L of fluid daily. Maintain a low salt diet and weigh yourself daily. For any significant increases in daily weight with associated swelling in the legs or abdomen with shortness of breath, please call your doctor or go to the emergency room. Follow up with  PRINCIPAL DISCHARGE DIAGNOSIS  Diagnosis: Congestive heart failure (CHF)  Assessment and Plan of Treatment: You have a weak heart or also known as heart failure. PLEASE TAKE LASIX (FUROSEMIDE) 40MG, METOPROLOL XL 25MG, LISINOPRIL 20MG AND AMLODIPINE 10MG ONCE A DAY. Avoid drinking more than 1.5L of fluid daily. Maintain a low salt diet and weigh yourself daily. For any significant increases in daily weight with associated swelling in the legs or abdomen with shortness of breath, please call your doctor or go to the emergency room. Follow up with Dr. Mujica in 1-2 weeks.

## 2019-08-04 NOTE — DIETITIAN INITIAL EVALUATION ADULT. - OTHER INFO
84 y.o F from home with FHx of MI and PMHx of HTN (not on prescription meds, only takes herbal supplements). Pt admitted for newly diagnosed CHF and hypertensive urgency. Pt cooks at home, follows Mediterranean diet, good appetite, eats well, NKFA.  lbs per pt, denies weight loss. Pt tolerating DASH/TLC diet, FR 1200 mL/d well with good >75% PO intake of meals. Denies N/V/D/C or pain. +BM 8/3. Skin intact. Attempted to provide diet edu, pt states she is frustrated and appears not ready to participate in nutrition teaching. RD will f/u per moderate risk protocol.

## 2019-08-04 NOTE — DISCHARGE NOTE PROVIDER - CARE PROVIDER_API CALL
Carlitos Carpio)  Internal Medicine  [address] [place] [address]  Phone: [phone]  Fax: [phone]  Follow Up Time: 2 weeks

## 2019-08-04 NOTE — DISCHARGE NOTE PROVIDER - INSTRUCTIONS
Please follow a heart healthy diet, low in sodium, cholesterol and fats. Also limit to 1.5L of fluids per day.

## 2019-08-05 DIAGNOSIS — Z86.79 PERSONAL HISTORY OF OTHER DISEASES OF THE CIRCULATORY SYSTEM: ICD-10-CM

## 2019-08-05 DIAGNOSIS — I50.9 HEART FAILURE, UNSPECIFIED: ICD-10-CM

## 2019-08-05 DIAGNOSIS — Z87.19 PERSONAL HISTORY OF OTHER DISEASES OF THE DIGESTIVE SYSTEM: ICD-10-CM

## 2019-08-05 DIAGNOSIS — Z82.49 FAMILY HISTORY OF ISCHEMIC HEART DISEASE AND OTHER DISEASES OF THE CIRCULATORY SYSTEM: ICD-10-CM

## 2019-08-05 DIAGNOSIS — H04.123 DRY EYE SYNDROME OF BILATERAL LACRIMAL GLANDS: ICD-10-CM

## 2019-08-06 ENCOUNTER — MOBILE ON CALL (OUTPATIENT)
Age: 84
End: 2019-08-06

## 2019-08-06 VITALS
OXYGEN SATURATION: 97 % | WEIGHT: 150.8 LBS | BODY MASS INDEX: 23.95 KG/M2 | RESPIRATION RATE: 16 BRPM | SYSTOLIC BLOOD PRESSURE: 148 MMHG | HEART RATE: 77 BPM | HEIGHT: 66.5 IN | DIASTOLIC BLOOD PRESSURE: 61 MMHG

## 2019-08-07 PROBLEM — H04.123 DRY EYES: Status: RESOLVED | Noted: 2019-08-07 | Resolved: 2019-08-07

## 2019-08-07 PROBLEM — I50.9 CHF (CONGESTIVE HEART FAILURE): Status: ACTIVE | Noted: 2019-08-07

## 2019-08-07 PROBLEM — Z87.19 HISTORY OF GASTROESOPHAGEAL REFLUX (GERD): Status: RESOLVED | Noted: 2019-08-07 | Resolved: 2019-08-07

## 2019-08-07 PROBLEM — Z86.79 HISTORY OF CONGESTIVE HEART FAILURE: Status: RESOLVED | Noted: 2019-08-07 | Resolved: 2019-08-07

## 2019-08-07 PROBLEM — Z82.49 FAMILY HISTORY OF MYOCARDIAL INFARCTION: Status: ACTIVE | Noted: 2019-08-07

## 2019-08-07 PROBLEM — Z86.79 HISTORY OF HYPERTENSION: Status: RESOLVED | Noted: 2019-08-07 | Resolved: 2019-08-07

## 2019-08-07 RX ORDER — FUROSEMIDE 40 MG/1
40 TABLET ORAL DAILY
Refills: 0 | Status: ACTIVE | COMMUNITY

## 2019-08-07 RX ORDER — PNV NO.95/FERROUS FUM/FOLIC AC 28MG-0.8MG
TABLET ORAL
Refills: 0 | Status: ACTIVE | COMMUNITY

## 2019-08-07 RX ORDER — THIAMINE HCL 50 MG
TABLET ORAL
Refills: 0 | Status: ACTIVE | COMMUNITY

## 2019-08-07 RX ORDER — ASCORBIC ACID 500 MG
TABLET ORAL
Refills: 0 | Status: ACTIVE | COMMUNITY

## 2019-08-07 RX ORDER — NIACIN 100 MG
TABLET ORAL
Refills: 0 | Status: ACTIVE | COMMUNITY

## 2019-08-07 RX ORDER — METOPROLOL SUCCINATE 25 MG/1
25 TABLET, EXTENDED RELEASE ORAL DAILY
Refills: 0 | Status: ACTIVE | COMMUNITY

## 2019-08-07 RX ORDER — AMLODIPINE BESYLATE 10 MG/1
10 TABLET ORAL DAILY
Refills: 0 | Status: ACTIVE | COMMUNITY

## 2019-08-07 RX ORDER — METHYLDOPA/HYDROCHLOROTHIAZIDE 250MG-25MG
TABLET ORAL
Refills: 0 | Status: ACTIVE | COMMUNITY

## 2019-08-07 RX ORDER — FOLIC ACID/MULTIVIT,IRON,MINER 0.4MG-18MG
TABLET ORAL
Refills: 0 | Status: ACTIVE | COMMUNITY

## 2019-08-07 RX ORDER — UBIDECARENONE/VIT E ACET 100MG-5
CAPSULE ORAL
Refills: 0 | Status: ACTIVE | COMMUNITY

## 2019-08-07 RX ORDER — ASCORBIC ACID 500 MG
100 TABLET ORAL
Refills: 0 | Status: ACTIVE | COMMUNITY

## 2019-08-07 RX ORDER — LISINOPRIL 20 MG/1
20 TABLET ORAL DAILY
Refills: 0 | Status: ACTIVE | COMMUNITY

## 2019-08-07 RX ORDER — SENNOSIDES 8.6 MG
TABLET ORAL
Refills: 0 | Status: ACTIVE | COMMUNITY

## 2019-08-07 NOTE — PHYSICAL EXAM
[No Acute Distress] : no acute distress [Well Nourished] : well nourished [Well Developed] : well developed [Well-Appearing] : well-appearing [Normal Sclera/Conjunctiva] : normal sclera/conjunctiva [Normal Outer Ear/Nose] : the outer ears and nose were normal in appearance [No JVD] : no jugular venous distention [Supple] : supple [No Respiratory Distress] : no respiratory distress  [Clear to Auscultation] : lungs were clear to auscultation bilaterally [No Accessory Muscle Use] : no accessory muscle use [Normal Rate] : normal rate  [Regular Rhythm] : with a regular rhythm [Normal S1, S2] : normal S1 and S2 [Soft] : abdomen soft [Non Tender] : non-tender [Non-distended] : non-distended [No CVA Tenderness] : no CVA  tenderness [No Joint Swelling] : no joint swelling [Grossly Normal Strength/Tone] : grossly normal strength/tone [No Rash] : no rash [Normal Gait] : normal gait [Coordination Grossly Intact] : coordination grossly intact [No Focal Deficits] : no focal deficits [Normal Affect] : the affect was normal [Normal Insight/Judgement] : insight and judgment were intact

## 2019-08-07 NOTE — ASSESSMENT
[FreeTextEntry1] : 84 year old female (poor historian) with PMHx HTN (not on prescription meds, only takes herbal supplements), family hx MI (father, 60s) who presented to the ED @ [facility] after being notified this evening of new findings of CHF, pulmonary edema on CTA coronaries performed earlier on 8/2. She is currently asymptomatic and only admits to some recent exertional SOB over the past two days (she works out on a bike 2h/day and has not noticed decrease in exercise tolerance). She states that she saw her primary doctor who performed an EKG earlier this week which showed PVCs. She was concerned so she had him send her for CTA coronaries. Upon arrival to ED patient was found to be hypertensive with /100. BNP on admit 10,192. Admitted with presumed chf exacerbation for management and evaluation underlying.\par

## 2019-08-07 NOTE — PLAN
[FreeTextEntry1] : CV status stable\par Medication education discussed in full detail with + teach back. \par Encouraged verbalization of fears and concerns. \par Report s/s of disease process\par Educated on monitoring blood pressure: reviewed home blood pressure monitoring with the patient and her son Mario and initiated vital sign and daily weight log \par Maintain Balanced diet \par Exercise as appropriate\par Patient and son Mario educated on s/s of CHF with + teach back. \par CN assisted the patient and son re: set up of home scale and weight log\par Enforced with patient need for daily weights. \par Advised to call for an increase of greater than 2 lbs in a day or 5 pounds in a week. \par Adhere to low salt diet and educated patient on foods that should be avoided such as processed or fast food. Limit fluids to 1 liter a day which is 4-5 glasses. \par Continue medications as ordered.  Follow up with Cardiologist Dr. Winslow.\par Reminded patient and her son Mario about TCM STAR  program, role of care navigator, clinical call center, yellow card with contact information.  Advised to call with any questions/concerns, verbalized understanding.

## 2019-08-07 NOTE — HEALTH RISK ASSESSMENT
[] : No [No] : No [No falls in past year] : Patient reported no falls in the past year [0] : 2) Feeling down, depressed, or hopeless: Not at all (0) [[id number]] : 0 [Low Salt Diet] : low salt [General Adherence] : general adherence [With Family] : lives with family [# of Members in Household ___] :  household currently consist of [unfilled] member(s) [Retired] : retired [Feels Safe at Home] : Feels safe at home [Fully functional (bathing, dressing, toileting, transferring, walking, feeding)] : Fully functional (bathing, dressing, toileting, transferring, walking, feeding) [Fully functional (using the telephone, shopping, preparing meals, housekeeping, doing laundry, using] : Fully functional and needs no help or supervision to perform IADLs (using the telephone, shopping, preparing meals, housekeeping, doing laundry, using transportation, managing medications and managing finances) [Reports changes in hearing] : Reports no changes in hearing [Reports changes in vision] : Reports no changes in vision [Reports changes in dental health] : Reports no changes in dental health [Smoke Detector] : smoke detector [Carbon Monoxide Detector] : carbon monoxide detector [Safety elements used in home] : safety elements used in home [de-identified] : Lives with her son and daughter in law [FreeTextEntry2] : Teacher

## 2019-08-07 NOTE — COUNSELING
[Fall prevention counseling provided] : Fall prevention counseling provided [Adequate lighting] : Adequate lighting [No throw rugs] : No throw rugs [Use recommended devices] : Use recommended devices [Behavioral health counseling provided] : Behavioral health counseling provided [Sleep ___ hours/day] : Sleep [unfilled] hours/day [Engage in a relaxing activity] : Engage in a relaxing activity [Plan in advance] : Plan in advance [Good understanding] : Patient has a good understanding of disease, goals and obesity follow-up plan [None] : None

## 2019-08-07 NOTE — HISTORY OF PRESENT ILLNESS
[Post-hospitalization from ___ Hospital] : Post-hospitalization from [unfilled] Hospital [Admitted on: ___] : The patient was admitted on [unfilled] [Discharged on ___] : discharged on [unfilled] [Discharge Summary] : discharge summary [Pertinent Labs] : pertinent labs [Discharge Med List] : discharge medication list [Med Reconciliation] : medication reconciliation has been completed [Patient Contacted By: ____] : and contacted by [unfilled] [FreeTextEntry2] : This patient is Enrolled in the STAR Program through [employer]: As per [facility] Discharge Summary\par 84 year old female with HTN (not on prescription meds, only takes herbal supplements), family hx MI (father, 60s) who presented to the ED @ [facility] after being notified this evening of new findings of CHF, pulmonary edema on CTA coronaries performed earlier on 8/2. She is currently asymptomatic and only admits to some recent exertional SOB over the past two days (she works out on a bike 2h/day and has not noticed decrease in exercise tolerance). She states that she saw her primary doctor who performed an EKG earlier this week which showed PVCs. She was concerned so she had him send her for CTA coronaries. Upon arrival to ED patient was found to be hypertensive with /100. BNP on admit 10,192. Admitted with presumed chf exacerbation for management and evaluation underlying.\par \par The patient was seen in her home on 8/6/2019.  The patient denies chest pain, shortness of breath, cough, hemoptysis, fever, palpitations, syncope, URI.  She takes a wide variety of vitamins, enzymes and nutrients that are not listed in AEHR database.\par

## 2019-08-08 DIAGNOSIS — I11.0 HYPERTENSIVE HEART DISEASE WITH HEART FAILURE: ICD-10-CM

## 2019-08-08 DIAGNOSIS — I27.20 PULMONARY HYPERTENSION, UNSPECIFIED: ICD-10-CM

## 2019-08-08 DIAGNOSIS — I31.3 PERICARDIAL EFFUSION (NONINFLAMMATORY): ICD-10-CM

## 2019-08-08 DIAGNOSIS — J81.1 CHRONIC PULMONARY EDEMA: ICD-10-CM

## 2019-08-08 DIAGNOSIS — I16.0 HYPERTENSIVE URGENCY: ICD-10-CM

## 2019-08-08 DIAGNOSIS — I50.9 HEART FAILURE, UNSPECIFIED: ICD-10-CM

## 2019-08-12 ENCOUNTER — MOBILE ON CALL (OUTPATIENT)
Age: 84
End: 2019-08-12

## 2019-09-13 NOTE — ED ADULT TRIAGE NOTE - NS ED TRIAGE AVPU SCALE
Alert-The patient is alert, awake and responds to voice. The patient is oriented to time, place, and person. The triage nurse is able to obtain subjective information.
(4) no impairment

## 2019-10-01 PROCEDURE — 84443 ASSAY THYROID STIM HORMONE: CPT

## 2019-10-01 PROCEDURE — 82553 CREATINE MB FRACTION: CPT

## 2019-10-01 PROCEDURE — 84484 ASSAY OF TROPONIN QUANT: CPT

## 2019-10-01 PROCEDURE — 71046 X-RAY EXAM CHEST 2 VIEWS: CPT

## 2019-10-01 PROCEDURE — 82550 ASSAY OF CK (CPK): CPT

## 2019-10-01 PROCEDURE — 93306 TTE W/DOPPLER COMPLETE: CPT

## 2019-10-01 PROCEDURE — 85025 COMPLETE CBC W/AUTO DIFF WBC: CPT

## 2019-10-01 PROCEDURE — 99285 EMERGENCY DEPT VISIT HI MDM: CPT | Mod: 25

## 2019-10-01 PROCEDURE — 82962 GLUCOSE BLOOD TEST: CPT

## 2019-10-01 PROCEDURE — 85610 PROTHROMBIN TIME: CPT

## 2019-10-01 PROCEDURE — 93005 ELECTROCARDIOGRAM TRACING: CPT

## 2019-10-01 PROCEDURE — 93225 XTRNL ECG REC<48 HRS REC: CPT

## 2019-10-01 PROCEDURE — 85027 COMPLETE CBC AUTOMATED: CPT

## 2019-10-01 PROCEDURE — 85730 THROMBOPLASTIN TIME PARTIAL: CPT

## 2019-10-01 PROCEDURE — 36415 COLL VENOUS BLD VENIPUNCTURE: CPT

## 2019-10-01 PROCEDURE — 83735 ASSAY OF MAGNESIUM: CPT

## 2019-10-01 PROCEDURE — 80048 BASIC METABOLIC PNL TOTAL CA: CPT

## 2019-10-01 PROCEDURE — 75571 CT HRT W/O DYE W/CA TEST: CPT

## 2019-10-01 PROCEDURE — 80053 COMPREHEN METABOLIC PANEL: CPT

## 2019-10-01 PROCEDURE — 81003 URINALYSIS AUTO W/O SCOPE: CPT

## 2019-10-01 PROCEDURE — 83880 ASSAY OF NATRIURETIC PEPTIDE: CPT

## 2021-10-31 VITALS
SYSTOLIC BLOOD PRESSURE: 188 MMHG | RESPIRATION RATE: 18 BRPM | TEMPERATURE: 98 F | HEART RATE: 114 BPM | DIASTOLIC BLOOD PRESSURE: 113 MMHG | OXYGEN SATURATION: 94 % | HEIGHT: 66 IN

## 2021-10-31 PROCEDURE — 99285 EMERGENCY DEPT VISIT HI MDM: CPT | Mod: 25

## 2021-10-31 PROCEDURE — 71046 X-RAY EXAM CHEST 2 VIEWS: CPT | Mod: 26

## 2021-10-31 PROCEDURE — 73110 X-RAY EXAM OF WRIST: CPT | Mod: 26

## 2021-10-31 PROCEDURE — 73130 X-RAY EXAM OF HAND: CPT | Mod: 26

## 2021-10-31 PROCEDURE — 93010 ELECTROCARDIOGRAM REPORT: CPT | Mod: 59

## 2021-10-31 NOTE — ED ADULT TRIAGE NOTE - RESPIRATORY RATE (BREATHS/MIN)
[EENT/Resp Symptoms] : EENT/RESPIRATORY SYMPTOMS [Sore Throat] : sore throat [FreeTextEntry9] : EAR PAIN [FreeTextEntry6] : EAR PAIN X2 DAYS. SORE THROAT X2DAYS. \par NO FEVER, CHILLS, CHANGE OF APPETITE, OR COUGH \par HX- OF SINUSITIS AND SEASONAL ALLERGIES.  18

## 2021-10-31 NOTE — ED ADULT TRIAGE NOTE - ARRIVAL INFO ADDITIONAL COMMENTS
pt states she fell in a lobby tonight landing on her hands.   inner wrists ecchymotic (r>l).   c/o left arm pain.

## 2021-10-31 NOTE — ED ADULT NURSE NOTE - NSIMPLEMENTINTERV_GEN_ALL_ED
Implemented All Fall Risk Interventions:  [place] to call system. Call bell, personal items and telephone within reach. Instruct patient to call for assistance. Room bathroom lighting operational. Non-slip footwear when patient is off stretcher. Physically safe environment: no spills, clutter or unnecessary equipment. Stretcher in lowest position, wheels locked, appropriate side rails in place. Provide visual cue, wrist band, yellow gown, etc. Monitor gait and stability. Monitor for mental status changes and reorient to person, place, and time. Review medications for side effects contributing to fall risk. Reinforce activity limits and safety measures with patient and family.

## 2021-11-01 ENCOUNTER — INPATIENT (INPATIENT)
Facility: HOSPITAL | Age: 86
LOS: 0 days | Discharge: LEFT BEFORE DISCHARGE | DRG: 291 | End: 2021-11-02
Attending: INTERNAL MEDICINE | Admitting: INTERNAL MEDICINE
Payer: MEDICARE

## 2021-11-01 DIAGNOSIS — I10 ESSENTIAL (PRIMARY) HYPERTENSION: ICD-10-CM

## 2021-11-01 DIAGNOSIS — I50.23 ACUTE ON CHRONIC SYSTOLIC (CONGESTIVE) HEART FAILURE: ICD-10-CM
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
## 2021-11-01 LAB
ALBUMIN SERPL ELPH-MCNC: 4.1 G/DL — SIGNIFICANT CHANGE UP (ref 3.3–5)
ALP SERPL-CCNC: 97 U/L — SIGNIFICANT CHANGE UP (ref 40–120)
ALT FLD-CCNC: 95 U/L — HIGH (ref 10–45)
ANION GAP SERPL CALC-SCNC: 13 MMOL/L — SIGNIFICANT CHANGE UP (ref 5–17)
ANION GAP SERPL CALC-SCNC: 9 MMOL/L — SIGNIFICANT CHANGE UP (ref 5–17)
AST SERPL-CCNC: 45 U/L — HIGH (ref 10–40)
BASOPHILS # BLD AUTO: 0.05 K/UL — SIGNIFICANT CHANGE UP (ref 0–0.2)
BASOPHILS NFR BLD AUTO: 0.6 % — SIGNIFICANT CHANGE UP (ref 0–2)
BILIRUB SERPL-MCNC: 0.5 MG/DL — SIGNIFICANT CHANGE UP (ref 0.2–1.2)
BUN SERPL-MCNC: 17 MG/DL — SIGNIFICANT CHANGE UP (ref 7–23)
BUN SERPL-MCNC: 19 MG/DL — SIGNIFICANT CHANGE UP (ref 7–23)
CALCIUM SERPL-MCNC: 9.3 MG/DL — SIGNIFICANT CHANGE UP (ref 8.4–10.5)
CALCIUM SERPL-MCNC: 9.4 MG/DL — SIGNIFICANT CHANGE UP (ref 8.4–10.5)
CHLORIDE SERPL-SCNC: 103 MMOL/L — SIGNIFICANT CHANGE UP (ref 96–108)
CHLORIDE SERPL-SCNC: 108 MMOL/L — SIGNIFICANT CHANGE UP (ref 96–108)
CHOLEST SERPL-MCNC: 234 MG/DL — HIGH
CK MB CFR SERPL CALC: 5.1 NG/ML — SIGNIFICANT CHANGE UP (ref 0–6.7)
CK SERPL-CCNC: 149 U/L — SIGNIFICANT CHANGE UP (ref 25–170)
CO2 SERPL-SCNC: 23 MMOL/L — SIGNIFICANT CHANGE UP (ref 22–31)
CO2 SERPL-SCNC: 25 MMOL/L — SIGNIFICANT CHANGE UP (ref 22–31)
CREAT SERPL-MCNC: 1.16 MG/DL — SIGNIFICANT CHANGE UP (ref 0.5–1.3)
CREAT SERPL-MCNC: 1.21 MG/DL — SIGNIFICANT CHANGE UP (ref 0.5–1.3)
EOSINOPHIL # BLD AUTO: 0.22 K/UL — SIGNIFICANT CHANGE UP (ref 0–0.5)
EOSINOPHIL NFR BLD AUTO: 2.6 % — SIGNIFICANT CHANGE UP (ref 0–6)
GLUCOSE SERPL-MCNC: 111 MG/DL — HIGH (ref 70–99)
GLUCOSE SERPL-MCNC: 116 MG/DL — HIGH (ref 70–99)
HCT VFR BLD CALC: 37.8 % — SIGNIFICANT CHANGE UP (ref 34.5–45)
HDLC SERPL-MCNC: 56 MG/DL — SIGNIFICANT CHANGE UP
HGB BLD-MCNC: 11.6 G/DL — SIGNIFICANT CHANGE UP (ref 11.5–15.5)
IMM GRANULOCYTES NFR BLD AUTO: 0.4 % — SIGNIFICANT CHANGE UP (ref 0–1.5)
LIPID PNL WITH DIRECT LDL SERPL: 160 MG/DL — HIGH
LYMPHOCYTES # BLD AUTO: 1.14 K/UL — SIGNIFICANT CHANGE UP (ref 1–3.3)
LYMPHOCYTES # BLD AUTO: 13.3 % — SIGNIFICANT CHANGE UP (ref 13–44)
MAGNESIUM SERPL-MCNC: 2.2 MG/DL — SIGNIFICANT CHANGE UP (ref 1.6–2.6)
MCHC RBC-ENTMCNC: 26.9 PG — LOW (ref 27–34)
MCHC RBC-ENTMCNC: 30.7 GM/DL — LOW (ref 32–36)
MCV RBC AUTO: 87.7 FL — SIGNIFICANT CHANGE UP (ref 80–100)
MONOCYTES # BLD AUTO: 0.59 K/UL — SIGNIFICANT CHANGE UP (ref 0–0.9)
MONOCYTES NFR BLD AUTO: 6.9 % — SIGNIFICANT CHANGE UP (ref 2–14)
NEUTROPHILS # BLD AUTO: 6.51 K/UL — SIGNIFICANT CHANGE UP (ref 1.8–7.4)
NEUTROPHILS NFR BLD AUTO: 76.2 % — SIGNIFICANT CHANGE UP (ref 43–77)
NON HDL CHOLESTEROL: 178 MG/DL — HIGH
NRBC # BLD: 0 /100 WBCS — SIGNIFICANT CHANGE UP (ref 0–0)
NT-PROBNP SERPL-SCNC: HIGH PG/ML (ref 0–300)
PHOSPHATE SERPL-MCNC: 4.4 MG/DL — SIGNIFICANT CHANGE UP (ref 2.5–4.5)
PLATELET # BLD AUTO: 285 K/UL — SIGNIFICANT CHANGE UP (ref 150–400)
POTASSIUM SERPL-MCNC: 3.7 MMOL/L — SIGNIFICANT CHANGE UP (ref 3.5–5.3)
POTASSIUM SERPL-MCNC: 4.7 MMOL/L — SIGNIFICANT CHANGE UP (ref 3.5–5.3)
POTASSIUM SERPL-SCNC: 3.7 MMOL/L — SIGNIFICANT CHANGE UP (ref 3.5–5.3)
POTASSIUM SERPL-SCNC: 4.7 MMOL/L — SIGNIFICANT CHANGE UP (ref 3.5–5.3)
PROT SERPL-MCNC: 6.5 G/DL — SIGNIFICANT CHANGE UP (ref 6–8.3)
RBC # BLD: 4.31 M/UL — SIGNIFICANT CHANGE UP (ref 3.8–5.2)
RBC # FLD: 15.3 % — HIGH (ref 10.3–14.5)
SARS-COV-2 RNA SPEC QL NAA+PROBE: NEGATIVE — SIGNIFICANT CHANGE UP
SODIUM SERPL-SCNC: 140 MMOL/L — SIGNIFICANT CHANGE UP (ref 135–145)
SODIUM SERPL-SCNC: 141 MMOL/L — SIGNIFICANT CHANGE UP (ref 135–145)
TRIGL SERPL-MCNC: 88 MG/DL — SIGNIFICANT CHANGE UP
TROPONIN T SERPL-MCNC: 0.01 NG/ML — SIGNIFICANT CHANGE UP (ref 0–0.01)
TROPONIN T SERPL-MCNC: 0.02 NG/ML — HIGH (ref 0–0.01)
TROPONIN T SERPL-MCNC: <0.01 NG/ML — SIGNIFICANT CHANGE UP (ref 0–0.01)
TSH SERPL-MCNC: 1.74 UIU/ML — SIGNIFICANT CHANGE UP (ref 0.27–4.2)
WBC # BLD: 8.54 K/UL — SIGNIFICANT CHANGE UP (ref 3.8–10.5)
WBC # FLD AUTO: 8.54 K/UL — SIGNIFICANT CHANGE UP (ref 3.8–10.5)

## 2021-11-01 PROCEDURE — 71046 X-RAY EXAM CHEST 2 VIEWS: CPT | Mod: 26

## 2021-11-01 PROCEDURE — 73130 X-RAY EXAM OF HAND: CPT | Mod: 26,50

## 2021-11-01 PROCEDURE — 93970 EXTREMITY STUDY: CPT | Mod: 26

## 2021-11-01 PROCEDURE — 73110 X-RAY EXAM OF WRIST: CPT | Mod: 26,50

## 2021-11-01 PROCEDURE — 93306 TTE W/DOPPLER COMPLETE: CPT | Mod: 26

## 2021-11-01 RX ORDER — ASPIRIN/CALCIUM CARB/MAGNESIUM 324 MG
325 TABLET ORAL ONCE
Refills: 0 | Status: COMPLETED | OUTPATIENT
Start: 2021-11-01 | End: 2021-11-01

## 2021-11-01 RX ORDER — ASPIRIN/CALCIUM CARB/MAGNESIUM 324 MG
81 TABLET ORAL DAILY
Refills: 0 | Status: DISCONTINUED | OUTPATIENT
Start: 2021-11-01 | End: 2021-11-02

## 2021-11-01 RX ORDER — FUROSEMIDE 40 MG
40 TABLET ORAL EVERY 12 HOURS
Refills: 0 | Status: DISCONTINUED | OUTPATIENT
Start: 2021-11-01 | End: 2021-11-02

## 2021-11-01 RX ORDER — AMLODIPINE BESYLATE 2.5 MG/1
5 TABLET ORAL DAILY
Refills: 0 | Status: DISCONTINUED | OUTPATIENT
Start: 2021-11-01 | End: 2021-11-02

## 2021-11-01 RX ORDER — FUROSEMIDE 40 MG
20 TABLET ORAL ONCE
Refills: 0 | Status: DISCONTINUED | OUTPATIENT
Start: 2021-11-01 | End: 2021-11-01

## 2021-11-01 RX ORDER — LOSARTAN POTASSIUM 100 MG/1
25 TABLET, FILM COATED ORAL DAILY
Refills: 0 | Status: DISCONTINUED | OUTPATIENT
Start: 2021-11-01 | End: 2021-11-02

## 2021-11-01 RX ORDER — ACETAMINOPHEN 500 MG
975 TABLET ORAL ONCE
Refills: 0 | Status: COMPLETED | OUTPATIENT
Start: 2021-11-01 | End: 2021-11-01

## 2021-11-01 RX ORDER — HEPARIN SODIUM 5000 [USP'U]/ML
5000 INJECTION INTRAVENOUS; SUBCUTANEOUS EVERY 12 HOURS
Refills: 0 | Status: DISCONTINUED | OUTPATIENT
Start: 2021-11-01 | End: 2021-11-01

## 2021-11-01 RX ORDER — HEPARIN SODIUM 5000 [USP'U]/ML
5000 INJECTION INTRAVENOUS; SUBCUTANEOUS EVERY 8 HOURS
Refills: 0 | Status: DISCONTINUED | OUTPATIENT
Start: 2021-11-01 | End: 2021-11-02

## 2021-11-01 RX ORDER — FUROSEMIDE 40 MG
40 TABLET ORAL ONCE
Refills: 0 | Status: COMPLETED | OUTPATIENT
Start: 2021-11-01 | End: 2021-11-01

## 2021-11-01 RX ORDER — LISINOPRIL 2.5 MG/1
20 TABLET ORAL ONCE
Refills: 0 | Status: COMPLETED | OUTPATIENT
Start: 2021-11-01 | End: 2021-11-01

## 2021-11-01 RX ORDER — INFLUENZA VIRUS VACCINE 15; 15; 15; 15 UG/.5ML; UG/.5ML; UG/.5ML; UG/.5ML
0.7 SUSPENSION INTRAMUSCULAR ONCE
Refills: 0 | Status: DISCONTINUED | OUTPATIENT
Start: 2021-11-01 | End: 2021-11-02

## 2021-11-01 RX ADMIN — Medication 975 MILLIGRAM(S): at 02:36

## 2021-11-01 RX ADMIN — Medication 325 MILLIGRAM(S): at 04:19

## 2021-11-01 RX ADMIN — Medication 975 MILLIGRAM(S): at 01:28

## 2021-11-01 RX ADMIN — Medication 81 MILLIGRAM(S): at 10:35

## 2021-11-01 RX ADMIN — HEPARIN SODIUM 5000 UNIT(S): 5000 INJECTION INTRAVENOUS; SUBCUTANEOUS at 13:32

## 2021-11-01 RX ADMIN — LISINOPRIL 20 MILLIGRAM(S): 2.5 TABLET ORAL at 02:47

## 2021-11-01 RX ADMIN — Medication 40 MILLIGRAM(S): at 17:39

## 2021-11-01 RX ADMIN — LOSARTAN POTASSIUM 25 MILLIGRAM(S): 100 TABLET, FILM COATED ORAL at 13:41

## 2021-11-01 RX ADMIN — Medication 40 MILLIGRAM(S): at 02:51

## 2021-11-01 RX ADMIN — AMLODIPINE BESYLATE 5 MILLIGRAM(S): 2.5 TABLET ORAL at 07:01

## 2021-11-01 RX ADMIN — HEPARIN SODIUM 5000 UNIT(S): 5000 INJECTION INTRAVENOUS; SUBCUTANEOUS at 22:01

## 2021-11-01 RX ADMIN — Medication 40 MILLIGRAM(S): at 07:00

## 2021-11-01 NOTE — ED PROVIDER NOTE - OBJECTIVE STATEMENT
85 yo female who does not believe in medicine and prefers naturopathy, has not seen a doctor in a long time, h/o htn, chf c/o mechanical fall this afternoon when her foot caught in some loose paper.  Pt fell onto outstretched hands and twisted her back as she fell.  Pt c/o pain in bilat hands, wrists, R upper back.  Pt also noted her bp was high at home after the fall 194/132.  Pt reports she was prev treated for htn w beta blocker but it caused varicose veins so she stopped it years ago.  Pt's record indicates pt prev on Amlodipine 10, Lisinopril 20, Metroprolol 25, furosemide 40 in 2019.  Pt monitors her bp at home and noted bp 148/90, 147/97 yest.  Pt noted bilat le edema x several days and new pierson since fall.  No fever, uri sx, cough.  No cp.  No recent orthopnea, exertional cp.  No h/o cad, pulm disease, pe/dvt. No recent travel.  Pt tried using a natural diuretic w some decrease in LE edema.  No head trauma, ha, neck pain, low back pain, LE pain/injury.  No abd pain, n/v/d.  Pt admitted in 2019 for new htn urgency and chf; echo at that time Echo revealing mild symmetric LVH, mod segmental LV systolic dysfunction with akinesis of basal inferoseptum, entire inferior wall and entire inferolateral wall, EF 30-35%.  Pt declined ischemic eval at that time.

## 2021-11-01 NOTE — H&P ADULT - NSHPLABSRESULTS_GEN_ALL_CORE
11.6   8.54  )-----------( 285      ( 01 Nov 2021 01:00 )             37.8     140  |  108  |  19  ----------------------------<  111<H>  4.7   |  23  |  1.21    Ca    9.3      01 Nov 2021 01:00    CARDIAC MARKERS ( 01 Nov 2021 03:29 )  x     / 0.02 ng/mL / x     / x     / x      CARDIAC MARKERS ( 01 Nov 2021 01:00 )  x     / <0.01 ng/mL / x     / x     / x          EKG: NSR; no acute ischemic changes noted.

## 2021-11-01 NOTE — ED PROVIDER NOTE - CONSTITUTIONAL, MLM
normal... Well appearing, awake, alert, oriented to person, place, time/situation and in no apparent distress. no resp distress

## 2021-11-01 NOTE — PROGRESS NOTE ADULT - ASSESSMENT
85yo F, poor historian, poor compliance/follow-up (doesn't believe in Western medicine), PMHx of HTN and HFrEF (LVEF 30-35% in 8/2019 presented to [facility] ED following a mechanical fall. Wet read on Xrays from ED provider negative for fractures - f/u official reads. Pt found to have B/L pitting edema and endorsed HUNTER. Pt doesn't take any of her prescription meds (was previously prescribed Lasix 40mg PO QD, Lisinopril 20mg PO QD, Toprol 25mg PO QD, and Amlodipine 10mg PO QD) - instead, she takes many herbal supplements (as much as 40-60 daily per previous documentation). Admitted to acute on chronic HFrEF exacerbation as well as BP management.     **OF NOTE: previous admission 8/2019 w/ new onset HFrEF w/ LVEF 30-35% and HTN. Refused ischemic evaluation at that time.     ACTIVE ISSUES: HFrEF; HTN; medical non-compliance

## 2021-11-01 NOTE — PHYSICAL THERAPY INITIAL EVALUATION ADULT - GENERAL OBSERVATIONS, REHAB EVAL
Received supine complaints of wrist pain 4/10 +EKG, IV hep. Left on toilet +RN Jigna aware, call galvin

## 2021-11-01 NOTE — PROGRESS NOTE ADULT - PROBLEM SELECTOR PLAN 2
--SBP 150s-180s.    --Pt states she does not take prescription medications at home.    --CONT: Lisinopril 20mg PO QD and Amlodipine 5mg PO QD. --SBP 150s-180s.    --Pt states she does not take prescription medications at home.    --CONT Losartan and Amlodipine 5mg PO QD.

## 2021-11-01 NOTE — H&P ADULT - PROBLEM SELECTOR PLAN 1
--Dx w/ HFrEF in 8/2019; presents w/ HUNTER, 3+ pitting edema B/L LE, and (+) JVD; Lungs CTA B/L but CXR w/ blunted costophrenic angle b/l; SpO2 95% RA, no orthopnea.    --BNP ~16k.    --TTE (8/2019): LVEF 30-35%, mild symmetric LVH, RWMA (akinesis of basal inferoseptum, entire inferior wall and entire inferolateral wall), trace WI, mild MR, trace TR.    --**on previous admission 8/2019, pt REFUSED ischemic evaluation at that time.    --Pt states she does not take prescription medications at home (was discharged on Lasix 40mg PO QD, Toprol 25mg PO QD, Lisinopril 20mg PO QD, Amlodipine 10mg PO, QD.    --PLAN: continue Lasix 40mg IV BID; monitor I/Os; TTE ordered – f/u; reinitiate GBMT as pt amenable (pt refusing beta blockers due to it “causing varicose veins”); discuss possible ischemic evaluation with attending, and discuss with patient if she would be amenable to ischemic evaluation.

## 2021-11-01 NOTE — ED PROVIDER NOTE - CARE PLAN
1 Principal Discharge DX:	Acute CHF  Secondary Diagnosis:	Hypertension  Secondary Diagnosis:	Hand contusion  Secondary Diagnosis:	Wrist pain, left  Secondary Diagnosis:	Wrist pain, right  Secondary Diagnosis:	Fall

## 2021-11-01 NOTE — PROGRESS NOTE ADULT - SUBJECTIVE AND OBJECTIVE BOX
OVERNIGHT EVENTS: taran    SUBJECTIVE:  Patient seen and examined at bedside. states that she is interested in modern medicine now b/c wants more optimized fluid status.     Vital Signs Last 12 Hrs  T(F): 98.5 (11-01-21 @ 04:29), Max: 98.5 (11-01-21 @ 04:29)  HR: 84 (11-01-21 @ 08:56) (82 - 114)  BP: 154/77 (11-01-21 @ 08:56) (154/77 - 188/113)  BP(mean): 108 (11-01-21 @ 08:56) (108 - 109)  RR: 16 (11-01-21 @ 08:56) (16 - 19)  SpO2: 98% (11-01-21 @ 08:56) (94% - 98%)  I&O's Summary    31 Oct 2021 07:01  -  01 Nov 2021 07:00  --------------------------------------------------------  IN: 0 mL / OUT: 300 mL / NET: -300 mL    01 Nov 2021 07:01  -  01 Nov 2021 09:04  --------------------------------------------------------  IN: 0 mL / OUT: 300 mL / NET: -300 mL        PHYSICAL EXAM:  Constitutional: NAD, comfortable in bed.  HEENT: NC/AT, PERRLA, EOMI, no conjunctival pallor or scleral icterus, MMM  Neck: Supple, +JVD  Respiratory: CTA B/L. No w/r/r.   Cardiovascular: RRR, normal S1 and S2, no m/r/g.   Gastrointestinal: +BS, soft NTND, no guarding or rebound tenderness, no palpable masses   Extremities: wwp 3 + b/l pitting edema le  Vascular: Pulses equal and strong throughout.   Neurological: AAOx3, no CN deficits, strength and sensation intact throughout.   Skin: No gross skin abnormalities or rashes        LABS:                        11.6   8.54  )-----------( 285      ( 01 Nov 2021 01:00 )             37.8     11-01    140  |  108  |  19  ----------------------------<  111<H>  4.7   |  23  |  1.21    Ca    9.3      01 Nov 2021 01:00              RADIOLOGY & ADDITIONAL TESTS:    MEDICATIONS  (STANDING):  amLODIPine   Tablet 5 milliGRAM(s) Oral daily  aspirin enteric coated 81 milliGRAM(s) Oral daily  furosemide   Injectable 40 milliGRAM(s) IV Push every 12 hours  heparin   Injectable 5000 Unit(s) SubCutaneous every 8 hours  influenza  Vaccine (HIGH DOSE) 0.7 milliLiter(s) IntraMuscular once    MEDICATIONS  (PRN):

## 2021-11-01 NOTE — H&P ADULT - PROBLEM SELECTOR PLAN 3
--s/p mechanical fall (landed on outstretched hands; no head trauma).    --Per ER sign out, wet read of X-rays negative for fracture – f/u official X-ray reads.    --PT evaluation ordered     DVT ppx: Hep SQ   Dispo: pending work up

## 2021-11-01 NOTE — PHYSICAL THERAPY INITIAL EVALUATION ADULT - PERTINENT HX OF CURRENT PROBLEM, REHAB EVAL
86F presented to [facility] ED on 11/1/21 following a mechanical fall (tripped over clutter in the hallway) and landed on her hands. Cold read of Xrays negative for fractures. Pt is endorsing a few days of worsening LE edema and HUNTER.

## 2021-11-01 NOTE — PHYSICAL THERAPY INITIAL EVALUATION ADULT - ADDITIONAL COMMENTS
independent prior to arrival, 1 fall in past year this current admission, apartment, elevator, no steps, no hha

## 2021-11-01 NOTE — PROGRESS NOTE ADULT - PROBLEM SELECTOR PLAN 1
--Dx w/ HFrEF in 8/2019; presents w/ HUNTER, 3+ pitting edema B/L LE, and (+) JVD; Lungs CTA B/L but CXR w/ blunted costophrenic angle b/l; SpO2 95% RA, no orthopnea.    --BNP ~16k.    --TTE (8/2019): LVEF 30-35%, mild symmetric LVH, RWMA (akinesis of basal inferoseptum, entire inferior wall and entire inferolateral wall), trace ND, mild MR, trace TR.    --**on previous admission 8/2019, pt REFUSED ischemic evaluation at that time.    --Pt states she does not take prescription medications at home (was discharged on Lasix 40mg PO QD, Toprol 25mg PO QD, Lisinopril 20mg PO QD, Amlodipine 10mg PO, QD.    --PLAN: continue Lasix 40mg IV BID; monitor I/Os; TTE ordered – f/u; reinitiate GBMT as pt amenable (pt refusing beta blockers due to it “causing varicose veins”); discuss possible ischemic evaluation with attending, and discuss with patient if she would be amenable to ischemic evaluation. --Dx w/ HFrEF in 8/2019; presents w/ HUNTER, 3+ pitting edema B/L LE, and (+) JVD; Lungs CTA B/L but CXR w/ blunted costophrenic angle b/l; SpO2 95% RA, no orthopnea.    --BNP ~16k.    --TTE (8/2019): LVEF 30-35%, mild symmetric LVH, RWMA (akinesis of basal inferoseptum, entire inferior wall and entire inferolateral wall), trace AZ, mild MR, trace TR.    --**on previous admission 8/2019, pt REFUSED ischemic evaluation at that time.    --Pt states she does not take prescription medications at home (was discharged on Lasix 40mg PO QD, Toprol 25mg PO QD, Lisinopril 20mg PO QD, Amlodipine 10mg PO, QD.    --PLAN: continue Lasix 40mg IV BID; monitor I/Os; TTE ordered – f/u; reinitiate GBMT as pt amenable (pt refusing beta blockers due to it “causing varicose veins”); discuss possible ischemic evaluation with attending, and discuss with patient if she would be amenable to ischemic evaluation.  -started losartan today and c/w lasix as above  -f/u HF recs

## 2021-11-01 NOTE — H&P ADULT - PROBLEM SELECTOR PLAN 2
--SBP 150s-180s.    --Pt states she does not take prescription medications at home.    --CONT: Lisinopril 20mg PO QD and Amlodipine 5mg PO QD.

## 2021-11-01 NOTE — PROGRESS NOTE ADULT - PROBLEM SELECTOR PLAN 3
--s/p mechanical fall (landed on outstretched hands; no head trauma).    --Per ER sign out, wet read of X-rays negative for fracture – f/u official X-ray reads.    --PT evaluation ordered     DVT ppx: Hep SQ   Dispo: pending work up --s/p mechanical fall (landed on outstretched hands; no head trauma).    --xray read neg  --PT evaluation ordered     DVT ppx: Hep SQ   Dispo: pending work up

## 2021-11-01 NOTE — ED PROVIDER NOTE - MUSCULOSKELETAL, MLM
Spine appears normal, range of motion is not limited, no muscle or joint tenderness, + ecchymosis R hypothenar eminence area, R ulna, no bony ttp or deformity bilat fingers, hands, wrist, forearm, elbow, arm, shoulder, clavicle, radial 2+, 2+ edema bilat le, no calf ttp, dp 1+, neck and back nontender midline, no rib or torso ttp

## 2021-11-01 NOTE — H&P ADULT - ASSESSMENT
85yo F, poor historian, poor compliance/follow-up (doesn't believe in Western medicine), PMHx of HTN and HFrEF (LVEF 30-35% in 8/2019 presented to [facility] ED following a mechanical fall. Wet read on Xrays from ED provider negative for fractures - f/u official reads. Pt found to have B/L pitting edema and endorsed HUNTER. Pt doesn't take any of her prescription meds (was previously prescribed Lasix 40mg PO QD, Lisinopril 20mg PO QD, Toprol 25mg PO QD, and Amlodipine 10mg PO QD) - instead, she takes many herbal supplements (as much as 40-60 daily per previous documentation). Admitted to acute on chronic HFrEF exacerbation as well as BP management.     **OF NOTE: previous admission 8/2019 w/ new onset HFrEF w/ LVEF 30-35% and HTN. Refused ischemic evaluation at that time.     ACTIVE ISSUES: HFrEF; HTN; medical non-compliance

## 2021-11-01 NOTE — H&P ADULT - HISTORY OF PRESENT ILLNESS
Pt is 87yo Female, poor historian, poor follow up and poor medical compliance (doesn’t believe in Western medicine; prefers naturopathy) w/ PMHx of HTN, HFrEF (LVEF 30%) who presented to [facility] ED on 11/1/21 following a mechanical fall (tripped over clutter in the hallway) and landed on her hands. Cold read of Xrays negative for fractures. Pt is endorsing a few days of worsening LE edema and HUNTER. Pt denies CP, SOB at rest, cough, palplitationss, dizziness/syncope, orthopnea, PND, abdominal pain, N/V.      ***OF NOTE: pt w/ admission 8/2019 for HTN urgency and newly diagnosed HFrEF exacerbation. REFUSED ISCHEMIC EVAL at that time. Discharged on: Lasix 40mg PO QD, Toprol 25mg PO QD, Lisinopril 20mg PO QD, and Amlodipine 10mg PO QD. States she DOES NOT TAKE prescription medications anymore and hasn’t seen her cardiologist in a very long time.     VITALS: HR 85, -188/100-113, O2 98% RA, RR 18, T 98.5 F. LABS: WBC 8.54, Hgb/Hct 11.6/37.8, Plt Cnt 285, Na 140, K 4.7, BUN/Cr 19/1.21, Trop T < 0.01 --> 0.02, BNP 16,506. B/L LE Venous Duplex (-) for DVTs.      TREATMENT IN ED: Lasix 40mg IV x1, Lisinopril 20mg PO x1, Aspirin 325mg PO x1, Tylenol 975mg PO x1.      Patient admitted to cardiology for work up and management of suspected HFrEF exacerbation.

## 2021-11-01 NOTE — ED PROVIDER NOTE - WR INTERPRETATION 3
CXR negative - + pleural effusion R > L, pulm vasc congestion bilat, +  cardiomegaly (heart size similar to 8/19)

## 2021-11-01 NOTE — ED PROVIDER NOTE - PROGRESS NOTE DETAILS
CXR, bnp c/w chf, trop neg, bp slightly improved on repeat.  XRay wrist/hand neg on my read.  Pt in u/s.  Will discuss lasix, bp meds w pt. U/s neg for dvt.  Pt willing to take ace and diuretic.  Will give dose of each in ed, monitor bp and sx.  Consider dc w outpt w [place] w her cardiologist, Dr Ames vs admit based on response to meds. Rpt trop elevated to 0.02.  Pt pending repeat vs after meds.  Pt discussed w cardiology- will admit.

## 2021-11-01 NOTE — ED PROVIDER NOTE - CLINICAL SUMMARY MEDICAL DECISION MAKING FREE TEXT BOX
Pt c/o fall w bilat hand and wrist pain w/o sig bony ttp on exam.  Low concern for fx based on exam but will check xray.  Pt also c/o new sob today and recent le edema.  Pt denied h/o htn, chf but med record review revealed + h/o both and pt reports noncompliance w [facility] and md follow up.  I believe pt's sx are due to chf vs htn urgency, will also eval for dvt (no risks) and acs.  Plan labs, cxr, bilat le u/s, xray hand/wrists, pain meds, monitor bp after pain meds and consider bp meds for control (prev on mult oral meds and lasix).  Reassess.

## 2021-11-02 ENCOUNTER — TRANSCRIPTION ENCOUNTER (OUTPATIENT)
Age: 86
End: 2021-11-02

## 2021-11-02 VITALS — TEMPERATURE: 98 F

## 2021-11-02 LAB
A1C WITH ESTIMATED AVERAGE GLUCOSE RESULT: 5.2 % — SIGNIFICANT CHANGE UP (ref 4–5.6)
ANION GAP SERPL CALC-SCNC: 12 MMOL/L — SIGNIFICANT CHANGE UP (ref 5–17)
BUN SERPL-MCNC: 22 MG/DL — SIGNIFICANT CHANGE UP (ref 7–23)
CALCIUM SERPL-MCNC: 8.5 MG/DL — SIGNIFICANT CHANGE UP (ref 8.4–10.5)
CHLORIDE SERPL-SCNC: 112 MMOL/L — HIGH (ref 96–108)
CHOLEST SERPL-MCNC: 196 MG/DL — SIGNIFICANT CHANGE UP
CK MB CFR SERPL CALC: 2.5 NG/ML — SIGNIFICANT CHANGE UP (ref 0–6.7)
CK SERPL-CCNC: 81 U/L — SIGNIFICANT CHANGE UP (ref 25–170)
CO2 SERPL-SCNC: 24 MMOL/L — SIGNIFICANT CHANGE UP (ref 22–31)
COVID-19 SPIKE DOMAIN AB INTERP: POSITIVE
COVID-19 SPIKE DOMAIN ANTIBODY RESULT: 14.6 U/ML — HIGH
CREAT SERPL-MCNC: 1.07 MG/DL — SIGNIFICANT CHANGE UP (ref 0.5–1.3)
ESTIMATED AVERAGE GLUCOSE: 103 MG/DL — SIGNIFICANT CHANGE UP (ref 68–114)
GLUCOSE SERPL-MCNC: 88 MG/DL — SIGNIFICANT CHANGE UP (ref 70–99)
HCT VFR BLD CALC: 34.7 % — SIGNIFICANT CHANGE UP (ref 34.5–45)
HDLC SERPL-MCNC: 48 MG/DL — LOW
HGB BLD-MCNC: 10.7 G/DL — LOW (ref 11.5–15.5)
LIPID PNL WITH DIRECT LDL SERPL: 133 MG/DL — HIGH
MAGNESIUM SERPL-MCNC: 2.1 MG/DL — SIGNIFICANT CHANGE UP (ref 1.6–2.6)
MCHC RBC-ENTMCNC: 27.3 PG — SIGNIFICANT CHANGE UP (ref 27–34)
MCHC RBC-ENTMCNC: 30.8 GM/DL — LOW (ref 32–36)
MCV RBC AUTO: 88.5 FL — SIGNIFICANT CHANGE UP (ref 80–100)
NON HDL CHOLESTEROL: 148 MG/DL — HIGH
NRBC # BLD: 0 /100 WBCS — SIGNIFICANT CHANGE UP (ref 0–0)
PHOSPHATE SERPL-MCNC: 4.2 MG/DL — SIGNIFICANT CHANGE UP (ref 2.5–4.5)
PLATELET # BLD AUTO: 254 K/UL — SIGNIFICANT CHANGE UP (ref 150–400)
POTASSIUM SERPL-MCNC: 3.4 MMOL/L — LOW (ref 3.5–5.3)
POTASSIUM SERPL-SCNC: 3.4 MMOL/L — LOW (ref 3.5–5.3)
RBC # BLD: 3.92 M/UL — SIGNIFICANT CHANGE UP (ref 3.8–5.2)
RBC # FLD: 15.3 % — HIGH (ref 10.3–14.5)
SARS-COV-2 IGG+IGM SERPL QL IA: 14.6 U/ML — HIGH
SARS-COV-2 IGG+IGM SERPL QL IA: POSITIVE
SODIUM SERPL-SCNC: 148 MMOL/L — HIGH (ref 135–145)
TRIGL SERPL-MCNC: 77 MG/DL — SIGNIFICANT CHANGE UP
TROPONIN T SERPL-MCNC: 0.01 NG/ML — SIGNIFICANT CHANGE UP (ref 0–0.01)
WBC # BLD: 5.07 K/UL — SIGNIFICANT CHANGE UP (ref 3.8–10.5)
WBC # FLD AUTO: 5.07 K/UL — SIGNIFICANT CHANGE UP (ref 3.8–10.5)

## 2021-11-02 RX ORDER — POTASSIUM CHLORIDE 20 MEQ
40 PACKET (EA) ORAL EVERY 4 HOURS
Refills: 0 | Status: DISCONTINUED | OUTPATIENT
Start: 2021-11-02 | End: 2021-11-02

## 2021-11-02 RX ORDER — AMLODIPINE BESYLATE 2.5 MG/1
1 TABLET ORAL
Qty: 90 | Refills: 0
Start: 2021-11-02 | End: 2022-01-30

## 2021-11-02 RX ORDER — LOSARTAN POTASSIUM 100 MG/1
1 TABLET, FILM COATED ORAL
Qty: 90 | Refills: 0
Start: 2021-11-02 | End: 2022-01-30

## 2021-11-02 RX ORDER — ASPIRIN/CALCIUM CARB/MAGNESIUM 324 MG
1 TABLET ORAL
Qty: 90 | Refills: 0
Start: 2021-11-02 | End: 2022-01-30

## 2021-11-02 RX ORDER — METOPROLOL TARTRATE 50 MG
1 TABLET ORAL
Qty: 90 | Refills: 0
Start: 2021-11-02 | End: 2022-01-30

## 2021-11-02 RX ADMIN — HEPARIN SODIUM 5000 UNIT(S): 5000 INJECTION INTRAVENOUS; SUBCUTANEOUS at 05:58

## 2021-11-02 RX ADMIN — Medication 40 MILLIGRAM(S): at 05:58

## 2021-11-02 RX ADMIN — AMLODIPINE BESYLATE 5 MILLIGRAM(S): 2.5 TABLET ORAL at 05:58

## 2021-11-02 RX ADMIN — LOSARTAN POTASSIUM 25 MILLIGRAM(S): 100 TABLET, FILM COATED ORAL at 05:58

## 2021-11-02 NOTE — DISCHARGE NOTE PROVIDER - HOSPITAL COURSE
#Discharge: do not delete    87yo F, poor historian, poor compliance/follow-up (doesn't believe in Western medicine), PMHx of HTN and HFrEF (LVEF 30-35% in 8/2019 presented to [facility] ED following a mechanical fall. Wet read on Xrays from ED provider negative for fractures - f/u official reads. Pt found to have B/L pitting edema and endorsed HUNTER. Pt doesn't take any of her prescription meds (was previously prescribed Lasix 40mg PO QD, Lisinopril 20mg PO QD, Toprol 25mg PO QD, and Amlodipine 10mg PO QD) - instead, she takes many herbal supplements (as much as 40-60 daily per previous documentation). Admitted to acute on chronic HFrEF exacerbation as well as BP management but now leaving AMA because she wants to "improve her circulation" and "take natural supplements". Pt has capacity and risks of leaving AMA including death were explained in great detail.        Problem/Plan - 1:  ·  Problem: Acute on chronic HFrEF (heart failure with reduced ejection fraction).   ·  Plan: --Dx w/ HFrEF in 8/2019; presents w/ HUNTER, 3+ pitting edema B/L LE, and (+) JVD; Lungs CTA B/L but CXR w/ blunted costophrenic angle b/l; SpO2 95% RA, no orthopnea.    --BNP ~16k.    --TTE (8/2019): LVEF 30-35%, mild symmetric LVH, RWMA (akinesis of basal inferoseptum, entire inferior wall and entire inferolateral wall), trace VA, mild MR, trace TR.    --**on previous admission 8/2019, pt REFUSED ischemic evaluation at that time.    --Pt states she does not take prescription medications at home (was discharged on Lasix 40mg PO QD, Toprol 25mg PO QD, Lisinopril 20mg PO QD, Amlodipine 10mg PO, QD.    --PLAN: PT LEAVING AMA (SEE CHART NOTE) WILL BE D/C ON BETA BLOCKER, LOSARTAN, AMLODOPINE, TORSEMIDE AND F/U WITH CARDIOLOGIST OUTPT       Problem/Plan - 2:  ·  Problem: HTN (hypertension).   ·  Plan: --SBP 150s-180s.    --Pt states she does not take prescription medications at home.    --PLAN: PT LEAVING AMA (SEE CHART NOTE) WILL BE D/C ON BETA BLOCKER, LOSARTAN, AMLODOPINE, TORSEMIDE AND F/U WITH CARDIOLOGIST OUTPT

## 2021-11-02 NOTE — DISCHARGE NOTE PROVIDER - CARE PROVIDER_API CALL
Litzy Conner)  Cardiology; Internal Medicine; Interventional Cardiology  [address] [place] [address]  Phone: [phone]  Fax: [phone]  Follow Up Time: 1 week

## 2021-11-02 NOTE — DISCHARGE NOTE PROVIDER - NSDCCPTREATMENT_GEN_ALL_CORE_FT
PRINCIPAL PROCEDURE  Procedure: TTE (transthoracic echocardiography)  Findings and Treatment: CONCLUSIONS:   1. Mild-to-moderate mitral regurgitation.   2. Mild tricuspid regurgitation.   3. Pulmonary artery systolic pressure is 53 mmHg.   4. The right ventricle is normal in size. Right ventricular systolic function is normal.   5. There is mild concentric left ventricular hypertrophy. Left ventricular systolic function is severely reduced with a calculated ejection fraction of 25% with regional wall motion abnormalities. There is basal inferoseptal, basal to mid inferior, apical, apical septal, apical lateral, apical anterior and apical inferior wall akinesis. There is basal to mid inferolateral wall severe hypokinesis.   6. Trivial pericardial effusion.   7. Right pleural effusion.

## 2021-11-02 NOTE — DISCHARGE NOTE PROVIDER - NSDCCPCAREPLAN_GEN_ALL_CORE_FT
PRINCIPAL DISCHARGE DIAGNOSIS  Diagnosis: Acute CHF  Assessment and Plan of Treatment: Pt was found to be in exacerbation of CHF, or Congestive Heart Failure, a condition in which your heart doesnt pump blood as well as it should. It was treated with medications. An echo was performed which revealed your heart isn't functioning normally and we reccomended you undergo a cath to find out why. You refused and want to leave the hospital AMA. We are sending you on some medications to treat your CHF and please follow up with a cardiologist in the next week.

## 2021-11-02 NOTE — DISCHARGE NOTE PROVIDER - NSDCMRMEDTOKEN_GEN_ALL_CORE_FT
amLODIPine 5 mg oral tablet: 1 tab(s) orally once a day  aspirin 81 mg oral delayed release tablet: 1 tab(s) orally once a day  losartan 25 mg oral tablet: 1 tab(s) orally once a day  Toprol-XL 25 mg oral tablet, extended release: 1 tab(s) orally once a day   torsemide 20 mg oral tablet: 1 tab(s) orally once a day

## 2021-11-05 ENCOUNTER — NON-APPOINTMENT (OUTPATIENT)
Age: 86
End: 2021-11-05

## 2021-11-09 DIAGNOSIS — M25.531 PAIN IN RIGHT WRIST: ICD-10-CM

## 2021-11-09 DIAGNOSIS — M25.532 PAIN IN LEFT WRIST: ICD-10-CM

## 2021-11-09 DIAGNOSIS — I11.0 HYPERTENSIVE HEART DISEASE WITH HEART FAILURE: ICD-10-CM

## 2021-11-09 DIAGNOSIS — Z53.29 PROCEDURE AND TREATMENT NOT CARRIED OUT BECAUSE OF PATIENT'S DECISION FOR OTHER REASONS: ICD-10-CM

## 2021-11-09 DIAGNOSIS — I50.23 ACUTE ON CHRONIC SYSTOLIC (CONGESTIVE) HEART FAILURE: ICD-10-CM
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
## 2021-11-09 DIAGNOSIS — Z91.14 PATIENT'S OTHER NONCOMPLIANCE WITH MEDICATION REGIMEN: ICD-10-CM

## 2021-11-09 DIAGNOSIS — I50.20 UNSPECIFIED SYSTOLIC (CONGESTIVE) HEART FAILURE: ICD-10-CM

## 2021-11-17 NOTE — CHART NOTE - NSCHARTNOTEFT_GEN_A_CORE
PA called patient to inquire if meds were picked up from [facility]. Initially meds were sent to [facility] pharmacy, but patient required meds to be sent to [facility] as she lived on [facility] which were sent by NP navigator. Follow up call made by PA, and patient ([phone]) stated meds were received, although she did not  all of them, because she did not feel like she needed them all. Dr. Conner made aware.

## 2021-12-22 PROCEDURE — 83036 HEMOGLOBIN GLYCOSYLATED A1C: CPT

## 2021-12-22 PROCEDURE — 80048 BASIC METABOLIC PNL TOTAL CA: CPT

## 2021-12-22 PROCEDURE — 71046 X-RAY EXAM CHEST 2 VIEWS: CPT

## 2021-12-22 PROCEDURE — 93970 EXTREMITY STUDY: CPT

## 2021-12-22 PROCEDURE — 86769 SARS-COV-2 COVID-19 ANTIBODY: CPT

## 2021-12-22 PROCEDURE — 93005 ELECTROCARDIOGRAM TRACING: CPT

## 2021-12-22 PROCEDURE — 80053 COMPREHEN METABOLIC PANEL: CPT

## 2021-12-22 PROCEDURE — 82550 ASSAY OF CK (CPK): CPT

## 2021-12-22 PROCEDURE — 96374 THER/PROPH/DIAG INJ IV PUSH: CPT

## 2021-12-22 PROCEDURE — 83880 ASSAY OF NATRIURETIC PEPTIDE: CPT

## 2021-12-22 PROCEDURE — 36415 COLL VENOUS BLD VENIPUNCTURE: CPT

## 2021-12-22 PROCEDURE — 73130 X-RAY EXAM OF HAND: CPT

## 2021-12-22 PROCEDURE — 97161 PT EVAL LOW COMPLEX 20 MIN: CPT

## 2021-12-22 PROCEDURE — 99285 EMERGENCY DEPT VISIT HI MDM: CPT

## 2021-12-22 PROCEDURE — 84100 ASSAY OF PHOSPHORUS: CPT

## 2021-12-22 PROCEDURE — C8929: CPT

## 2021-12-22 PROCEDURE — 73110 X-RAY EXAM OF WRIST: CPT

## 2021-12-22 PROCEDURE — 84484 ASSAY OF TROPONIN QUANT: CPT

## 2021-12-22 PROCEDURE — 85025 COMPLETE CBC W/AUTO DIFF WBC: CPT

## 2021-12-22 PROCEDURE — 87635 SARS-COV-2 COVID-19 AMP PRB: CPT

## 2021-12-22 PROCEDURE — 83735 ASSAY OF MAGNESIUM: CPT

## 2021-12-22 PROCEDURE — 84443 ASSAY THYROID STIM HORMONE: CPT

## 2021-12-22 PROCEDURE — 82553 CREATINE MB FRACTION: CPT

## 2021-12-22 PROCEDURE — 80061 LIPID PANEL: CPT

## 2021-12-22 PROCEDURE — 85027 COMPLETE CBC AUTOMATED: CPT

## 2024-09-16 NOTE — ED PROVIDER NOTE - CADM POA CENTRAL LINE
Please refer to the most recent history and physical.     I have reviewed the history and physical and I have examined the patient.     Based on the exam, the history and physical is updated with the following changes: none, plan to proceed to OR as scheduled.    
No